# Patient Record
Sex: FEMALE | Race: WHITE | NOT HISPANIC OR LATINO | Employment: UNEMPLOYED | URBAN - METROPOLITAN AREA
[De-identification: names, ages, dates, MRNs, and addresses within clinical notes are randomized per-mention and may not be internally consistent; named-entity substitution may affect disease eponyms.]

---

## 2019-08-27 ENCOUNTER — APPOINTMENT (OUTPATIENT)
Dept: RADIOLOGY | Facility: MEDICAL CENTER | Age: 39
DRG: 102 | End: 2019-08-27
Attending: EMERGENCY MEDICINE
Payer: COMMERCIAL

## 2019-08-27 ENCOUNTER — HOSPITAL ENCOUNTER (INPATIENT)
Facility: MEDICAL CENTER | Age: 39
LOS: 1 days | DRG: 102 | End: 2019-08-28
Attending: EMERGENCY MEDICINE | Admitting: INTERNAL MEDICINE
Payer: COMMERCIAL

## 2019-08-27 DIAGNOSIS — R47.9 SPEECH DISTURBANCE, UNSPECIFIED TYPE: ICD-10-CM

## 2019-08-27 DIAGNOSIS — R51.9 ACUTE NONINTRACTABLE HEADACHE, UNSPECIFIED HEADACHE TYPE: ICD-10-CM

## 2019-08-27 LAB
ABO + RH BLD: NORMAL
ABO GROUP BLD: NORMAL
ALBUMIN SERPL BCP-MCNC: 4.6 G/DL (ref 3.2–4.9)
ALBUMIN/GLOB SERPL: 1.3 G/DL
ALP SERPL-CCNC: 67 U/L (ref 30–99)
ALT SERPL-CCNC: 17 U/L (ref 2–50)
ANION GAP SERPL CALC-SCNC: 11 MMOL/L (ref 0–11.9)
APTT PPP: 28.3 SEC (ref 24.7–36)
AST SERPL-CCNC: 20 U/L (ref 12–45)
BASOPHILS # BLD AUTO: 1.4 % (ref 0–1.8)
BASOPHILS # BLD: 0.14 K/UL (ref 0–0.12)
BILIRUB SERPL-MCNC: 0.5 MG/DL (ref 0.1–1.5)
BLD GP AB SCN SERPL QL: NORMAL
BUN SERPL-MCNC: 7 MG/DL (ref 8–22)
CALCIUM SERPL-MCNC: 9.9 MG/DL (ref 8.5–10.5)
CHLORIDE SERPL-SCNC: 105 MMOL/L (ref 96–112)
CO2 SERPL-SCNC: 20 MMOL/L (ref 20–33)
CREAT SERPL-MCNC: 0.71 MG/DL (ref 0.5–1.4)
EOSINOPHIL # BLD AUTO: 0.81 K/UL (ref 0–0.51)
EOSINOPHIL NFR BLD: 7.9 % (ref 0–6.9)
ERYTHROCYTE [DISTWIDTH] IN BLOOD BY AUTOMATED COUNT: 43.9 FL (ref 35.9–50)
GLOBULIN SER CALC-MCNC: 3.5 G/DL (ref 1.9–3.5)
GLUCOSE BLD-MCNC: 82 MG/DL (ref 65–99)
GLUCOSE SERPL-MCNC: 92 MG/DL (ref 65–99)
HCT VFR BLD AUTO: 44 % (ref 37–47)
HGB BLD-MCNC: 15.3 G/DL (ref 12–16)
IMM GRANULOCYTES # BLD AUTO: 0.02 K/UL (ref 0–0.11)
IMM GRANULOCYTES NFR BLD AUTO: 0.2 % (ref 0–0.9)
INR PPP: 0.94 (ref 0.87–1.13)
LYMPHOCYTES # BLD AUTO: 4.73 K/UL (ref 1–4.8)
LYMPHOCYTES NFR BLD: 46.4 % (ref 22–41)
MCH RBC QN AUTO: 33 PG (ref 27–33)
MCHC RBC AUTO-ENTMCNC: 34.8 G/DL (ref 33.6–35)
MCV RBC AUTO: 94.8 FL (ref 81.4–97.8)
MONOCYTES # BLD AUTO: 0.75 K/UL (ref 0–0.85)
MONOCYTES NFR BLD AUTO: 7.4 % (ref 0–13.4)
NEUTROPHILS # BLD AUTO: 3.75 K/UL (ref 2–7.15)
NEUTROPHILS NFR BLD: 36.7 % (ref 44–72)
NRBC # BLD AUTO: 0 K/UL
NRBC BLD-RTO: 0 /100 WBC
PLATELET # BLD AUTO: 357 K/UL (ref 164–446)
PMV BLD AUTO: 9.7 FL (ref 9–12.9)
POTASSIUM SERPL-SCNC: 3.8 MMOL/L (ref 3.6–5.5)
PROT SERPL-MCNC: 8.1 G/DL (ref 6–8.2)
PROTHROMBIN TIME: 12.7 SEC (ref 12–14.6)
RBC # BLD AUTO: 4.64 M/UL (ref 4.2–5.4)
RH BLD: NORMAL
SODIUM SERPL-SCNC: 136 MMOL/L (ref 135–145)
TROPONIN T SERPL-MCNC: 6 NG/L (ref 6–19)
WBC # BLD AUTO: 10.2 K/UL (ref 4.8–10.8)

## 2019-08-27 PROCEDURE — 86901 BLOOD TYPING SEROLOGIC RH(D): CPT

## 2019-08-27 PROCEDURE — 85730 THROMBOPLASTIN TIME PARTIAL: CPT

## 2019-08-27 PROCEDURE — 770020 HCHG ROOM/CARE - TELE (206)

## 2019-08-27 PROCEDURE — 70498 CT ANGIOGRAPHY NECK: CPT

## 2019-08-27 PROCEDURE — 70496 CT ANGIOGRAPHY HEAD: CPT

## 2019-08-27 PROCEDURE — 80053 COMPREHEN METABOLIC PANEL: CPT

## 2019-08-27 PROCEDURE — 84484 ASSAY OF TROPONIN QUANT: CPT

## 2019-08-27 PROCEDURE — 700105 HCHG RX REV CODE 258: Performed by: EMERGENCY MEDICINE

## 2019-08-27 PROCEDURE — 86850 RBC ANTIBODY SCREEN: CPT

## 2019-08-27 PROCEDURE — 99285 EMERGENCY DEPT VISIT HI MDM: CPT

## 2019-08-27 PROCEDURE — 700102 HCHG RX REV CODE 250 W/ 637 OVERRIDE(OP): Performed by: EMERGENCY MEDICINE

## 2019-08-27 PROCEDURE — 70450 CT HEAD/BRAIN W/O DYE: CPT

## 2019-08-27 PROCEDURE — 82962 GLUCOSE BLOOD TEST: CPT

## 2019-08-27 PROCEDURE — A9270 NON-COVERED ITEM OR SERVICE: HCPCS | Performed by: EMERGENCY MEDICINE

## 2019-08-27 PROCEDURE — 96374 THER/PROPH/DIAG INJ IV PUSH: CPT

## 2019-08-27 PROCEDURE — 0042T CT-CEREBRAL PERFUSION ANALYSIS: CPT

## 2019-08-27 PROCEDURE — 99223 1ST HOSP IP/OBS HIGH 75: CPT | Performed by: PSYCHIATRY & NEUROLOGY

## 2019-08-27 PROCEDURE — 96375 TX/PRO/DX INJ NEW DRUG ADDON: CPT

## 2019-08-27 PROCEDURE — 83735 ASSAY OF MAGNESIUM: CPT

## 2019-08-27 PROCEDURE — 85025 COMPLETE CBC W/AUTO DIFF WBC: CPT

## 2019-08-27 PROCEDURE — 700117 HCHG RX CONTRAST REV CODE 255: Performed by: EMERGENCY MEDICINE

## 2019-08-27 PROCEDURE — 700111 HCHG RX REV CODE 636 W/ 250 OVERRIDE (IP): Performed by: EMERGENCY MEDICINE

## 2019-08-27 PROCEDURE — 85610 PROTHROMBIN TIME: CPT

## 2019-08-27 PROCEDURE — 71045 X-RAY EXAM CHEST 1 VIEW: CPT

## 2019-08-27 PROCEDURE — 86900 BLOOD TYPING SEROLOGIC ABO: CPT

## 2019-08-27 PROCEDURE — 36415 COLL VENOUS BLD VENIPUNCTURE: CPT

## 2019-08-27 PROCEDURE — 93005 ELECTROCARDIOGRAM TRACING: CPT | Performed by: EMERGENCY MEDICINE

## 2019-08-27 RX ORDER — POLYETHYLENE GLYCOL 3350 17 G/17G
1 POWDER, FOR SOLUTION ORAL
Status: DISCONTINUED | OUTPATIENT
Start: 2019-08-27 | End: 2019-08-28 | Stop reason: HOSPADM

## 2019-08-27 RX ORDER — AMOXICILLIN 250 MG
2 CAPSULE ORAL 2 TIMES DAILY
Status: DISCONTINUED | OUTPATIENT
Start: 2019-08-28 | End: 2019-08-28 | Stop reason: HOSPADM

## 2019-08-27 RX ORDER — KETOROLAC TROMETHAMINE 30 MG/ML
15 INJECTION, SOLUTION INTRAMUSCULAR; INTRAVENOUS ONCE
Status: COMPLETED | OUTPATIENT
Start: 2019-08-27 | End: 2019-08-27

## 2019-08-27 RX ORDER — ACETAMINOPHEN 325 MG/1
650 TABLET ORAL EVERY 6 HOURS PRN
Status: DISCONTINUED | OUTPATIENT
Start: 2019-08-27 | End: 2019-08-28 | Stop reason: HOSPADM

## 2019-08-27 RX ORDER — SODIUM CHLORIDE 9 MG/ML
1000 INJECTION, SOLUTION INTRAVENOUS ONCE
Status: COMPLETED | OUTPATIENT
Start: 2019-08-27 | End: 2019-08-27

## 2019-08-27 RX ORDER — NICOTINE 21 MG/24HR
14 PATCH, TRANSDERMAL 24 HOURS TRANSDERMAL
Status: DISCONTINUED | OUTPATIENT
Start: 2019-08-28 | End: 2019-08-28 | Stop reason: HOSPADM

## 2019-08-27 RX ORDER — METOCLOPRAMIDE HYDROCHLORIDE 5 MG/ML
10 INJECTION INTRAMUSCULAR; INTRAVENOUS ONCE
Status: COMPLETED | OUTPATIENT
Start: 2019-08-27 | End: 2019-08-27

## 2019-08-27 RX ORDER — ACETAMINOPHEN 325 MG/1
650 TABLET ORAL ONCE
Status: COMPLETED | OUTPATIENT
Start: 2019-08-27 | End: 2019-08-27

## 2019-08-27 RX ORDER — DIPHENHYDRAMINE HYDROCHLORIDE 50 MG/ML
25 INJECTION INTRAMUSCULAR; INTRAVENOUS ONCE
Status: COMPLETED | OUTPATIENT
Start: 2019-08-27 | End: 2019-08-27

## 2019-08-27 RX ORDER — BISACODYL 10 MG
10 SUPPOSITORY, RECTAL RECTAL
Status: DISCONTINUED | OUTPATIENT
Start: 2019-08-27 | End: 2019-08-28 | Stop reason: HOSPADM

## 2019-08-27 RX ADMIN — METOCLOPRAMIDE 10 MG: 5 INJECTION, SOLUTION INTRAMUSCULAR; INTRAVENOUS at 22:30

## 2019-08-27 RX ADMIN — ACETAMINOPHEN 650 MG: 325 TABLET ORAL at 22:29

## 2019-08-27 RX ADMIN — DIPHENHYDRAMINE HYDROCHLORIDE 25 MG: 50 INJECTION INTRAMUSCULAR; INTRAVENOUS at 22:30

## 2019-08-27 RX ADMIN — IOHEXOL 120 ML: 350 INJECTION, SOLUTION INTRAVENOUS at 21:27

## 2019-08-27 RX ADMIN — KETOROLAC TROMETHAMINE 15 MG: 30 INJECTION, SOLUTION INTRAMUSCULAR at 22:30

## 2019-08-27 RX ADMIN — SODIUM CHLORIDE 1000 ML: 9 INJECTION, SOLUTION INTRAVENOUS at 22:29

## 2019-08-27 SDOH — HEALTH STABILITY: MENTAL HEALTH: HOW MANY STANDARD DRINKS CONTAINING ALCOHOL DO YOU HAVE ON A TYPICAL DAY?: 1 OR 2

## 2019-08-27 SDOH — HEALTH STABILITY: MENTAL HEALTH: HOW OFTEN DO YOU HAVE 6 OR MORE DRINKS ON ONE OCCASION?: NEVER

## 2019-08-27 SDOH — HEALTH STABILITY: MENTAL HEALTH: HOW OFTEN DO YOU HAVE A DRINK CONTAINING ALCOHOL?: 2-4 TIMES A MONTH

## 2019-08-27 ASSESSMENT — ENCOUNTER SYMPTOMS
TINGLING: 0
HEADACHES: 1
WEAKNESS: 0
DOUBLE VISION: 0
MEMORY LOSS: 0
BLURRED VISION: 1
LOSS OF CONSCIOUSNESS: 0
FOCAL WEAKNESS: 0
NECK PAIN: 0
SHORTNESS OF BREATH: 0
PHOTOPHOBIA: 0
FEVER: 0
SENSORY CHANGE: 0
DIZZINESS: 1
FALLS: 0
SPEECH CHANGE: 1
CHILLS: 0
TREMORS: 0
NERVOUS/ANXIOUS: 1
BACK PAIN: 0
SEIZURES: 0
NAUSEA: 0
MYALGIAS: 0
VOMITING: 0

## 2019-08-28 ENCOUNTER — APPOINTMENT (OUTPATIENT)
Dept: RADIOLOGY | Facility: MEDICAL CENTER | Age: 39
DRG: 102 | End: 2019-08-28
Attending: STUDENT IN AN ORGANIZED HEALTH CARE EDUCATION/TRAINING PROGRAM
Payer: COMMERCIAL

## 2019-08-28 ENCOUNTER — PATIENT OUTREACH (OUTPATIENT)
Dept: HEALTH INFORMATION MANAGEMENT | Facility: OTHER | Age: 39
End: 2019-08-28

## 2019-08-28 ENCOUNTER — APPOINTMENT (OUTPATIENT)
Dept: CARDIOLOGY | Facility: MEDICAL CENTER | Age: 39
DRG: 102 | End: 2019-08-28
Attending: STUDENT IN AN ORGANIZED HEALTH CARE EDUCATION/TRAINING PROGRAM
Payer: COMMERCIAL

## 2019-08-28 VITALS
WEIGHT: 169.97 LBS | BODY MASS INDEX: 31.28 KG/M2 | SYSTOLIC BLOOD PRESSURE: 97 MMHG | RESPIRATION RATE: 16 BRPM | HEIGHT: 62 IN | HEART RATE: 60 BPM | OXYGEN SATURATION: 98 % | DIASTOLIC BLOOD PRESSURE: 44 MMHG | TEMPERATURE: 98 F

## 2019-08-28 PROBLEM — R47.01 APHASIA: Status: ACTIVE | Noted: 2019-08-28

## 2019-08-28 PROBLEM — G45.9 TIA (TRANSIENT ISCHEMIC ATTACK): Status: ACTIVE | Noted: 2019-08-28

## 2019-08-28 LAB
ANION GAP SERPL CALC-SCNC: 8 MMOL/L (ref 0–11.9)
APPEARANCE UR: CLEAR
BILIRUB UR QL STRIP.AUTO: NEGATIVE
BUN SERPL-MCNC: 9 MG/DL (ref 8–22)
CALCIUM SERPL-MCNC: 8.9 MG/DL (ref 8.5–10.5)
CHLORIDE SERPL-SCNC: 110 MMOL/L (ref 96–112)
CHOLEST SERPL-MCNC: 195 MG/DL (ref 100–199)
CO2 SERPL-SCNC: 19 MMOL/L (ref 20–33)
COLOR UR: YELLOW
CREAT SERPL-MCNC: 0.77 MG/DL (ref 0.5–1.4)
CRP SERPL HS-MCNC: 0.09 MG/DL (ref 0–0.75)
DIGOXIN SERPL-MCNC: <0.1 NG/ML (ref 0.8–2)
EKG IMPRESSION: NORMAL
ERYTHROCYTE [DISTWIDTH] IN BLOOD BY AUTOMATED COUNT: 45.1 FL (ref 35.9–50)
ERYTHROCYTE [SEDIMENTATION RATE] IN BLOOD BY WESTERGREN METHOD: 3 MM/HOUR (ref 0–20)
EST. AVERAGE GLUCOSE BLD GHB EST-MCNC: 114 MG/DL
GLUCOSE SERPL-MCNC: 100 MG/DL (ref 65–99)
GLUCOSE UR STRIP.AUTO-MCNC: NEGATIVE MG/DL
HBA1C MFR BLD: 5.6 % (ref 0–5.6)
HCT VFR BLD AUTO: 42.3 % (ref 37–47)
HDLC SERPL-MCNC: 39 MG/DL
HGB BLD-MCNC: 14.1 G/DL (ref 12–16)
KETONES UR STRIP.AUTO-MCNC: NEGATIVE MG/DL
LDLC SERPL CALC-MCNC: 136 MG/DL
LEUKOCYTE ESTERASE UR QL STRIP.AUTO: NEGATIVE
LV EJECT FRACT  99904: 75
LV EJECT FRACT MOD 2C 99903: 83.53
LV EJECT FRACT MOD 4C 99902: 74.14
LV EJECT FRACT MOD BP 99901: 80.27
MAGNESIUM SERPL-MCNC: 2.1 MG/DL (ref 1.5–2.5)
MCH RBC QN AUTO: 32.1 PG (ref 27–33)
MCHC RBC AUTO-ENTMCNC: 33.3 G/DL (ref 33.6–35)
MCV RBC AUTO: 96.4 FL (ref 81.4–97.8)
MICRO URNS: NORMAL
NITRITE UR QL STRIP.AUTO: NEGATIVE
PH UR STRIP.AUTO: 6 [PH] (ref 5–8)
PLATELET # BLD AUTO: 354 K/UL (ref 164–446)
PMV BLD AUTO: 9.6 FL (ref 9–12.9)
POTASSIUM SERPL-SCNC: 3.5 MMOL/L (ref 3.6–5.5)
PROCALCITONIN SERPL-MCNC: <0.05 NG/ML
PROT UR QL STRIP: NEGATIVE MG/DL
RBC # BLD AUTO: 4.39 M/UL (ref 4.2–5.4)
RBC UR QL AUTO: NEGATIVE
SODIUM SERPL-SCNC: 137 MMOL/L (ref 135–145)
SP GR UR REFRACTOMETRY: >=1.045
TRIGL SERPL-MCNC: 100 MG/DL (ref 0–149)
TROPONIN T SERPL-MCNC: <6 NG/L (ref 6–19)
TSH SERPL DL<=0.005 MIU/L-ACNC: 4.36 UIU/ML (ref 0.38–5.33)
UROBILINOGEN UR STRIP.AUTO-MCNC: 1 MG/DL
WBC # BLD AUTO: 11.6 K/UL (ref 4.8–10.8)

## 2019-08-28 PROCEDURE — 93970 EXTREMITY STUDY: CPT

## 2019-08-28 PROCEDURE — 85027 COMPLETE CBC AUTOMATED: CPT

## 2019-08-28 PROCEDURE — 84484 ASSAY OF TROPONIN QUANT: CPT

## 2019-08-28 PROCEDURE — 93306 TTE W/DOPPLER COMPLETE: CPT | Mod: 26 | Performed by: INTERNAL MEDICINE

## 2019-08-28 PROCEDURE — 81003 URINALYSIS AUTO W/O SCOPE: CPT

## 2019-08-28 PROCEDURE — A9270 NON-COVERED ITEM OR SERVICE: HCPCS | Performed by: STUDENT IN AN ORGANIZED HEALTH CARE EDUCATION/TRAINING PROGRAM

## 2019-08-28 PROCEDURE — 700117 HCHG RX CONTRAST REV CODE 255: Performed by: STUDENT IN AN ORGANIZED HEALTH CARE EDUCATION/TRAINING PROGRAM

## 2019-08-28 PROCEDURE — 93306 TTE W/DOPPLER COMPLETE: CPT

## 2019-08-28 PROCEDURE — 83036 HEMOGLOBIN GLYCOSYLATED A1C: CPT

## 2019-08-28 PROCEDURE — 99223 1ST HOSP IP/OBS HIGH 75: CPT | Performed by: INTERNAL MEDICINE

## 2019-08-28 PROCEDURE — 86140 C-REACTIVE PROTEIN: CPT

## 2019-08-28 PROCEDURE — 36415 COLL VENOUS BLD VENIPUNCTURE: CPT

## 2019-08-28 PROCEDURE — 84145 PROCALCITONIN (PCT): CPT

## 2019-08-28 PROCEDURE — 80162 ASSAY OF DIGOXIN TOTAL: CPT

## 2019-08-28 PROCEDURE — 700102 HCHG RX REV CODE 250 W/ 637 OVERRIDE(OP): Performed by: STUDENT IN AN ORGANIZED HEALTH CARE EDUCATION/TRAINING PROGRAM

## 2019-08-28 PROCEDURE — 70553 MRI BRAIN STEM W/O & W/DYE: CPT

## 2019-08-28 PROCEDURE — 93970 EXTREMITY STUDY: CPT | Mod: 26 | Performed by: INTERNAL MEDICINE

## 2019-08-28 PROCEDURE — 80061 LIPID PANEL: CPT

## 2019-08-28 PROCEDURE — 84443 ASSAY THYROID STIM HORMONE: CPT

## 2019-08-28 PROCEDURE — 99222 1ST HOSP IP/OBS MODERATE 55: CPT | Mod: GC | Performed by: INTERNAL MEDICINE

## 2019-08-28 PROCEDURE — 80048 BASIC METABOLIC PNL TOTAL CA: CPT

## 2019-08-28 PROCEDURE — 85652 RBC SED RATE AUTOMATED: CPT

## 2019-08-28 PROCEDURE — 99231 SBSQ HOSP IP/OBS SF/LOW 25: CPT | Performed by: PSYCHIATRY & NEUROLOGY

## 2019-08-28 RX ORDER — NICOTINE 21 MG/24HR
14 PATCH, TRANSDERMAL 24 HOURS TRANSDERMAL CONTINUOUS
Qty: 14 PATCH | Refills: 0 | Status: SHIPPED | OUTPATIENT
Start: 2019-08-28 | End: 2019-09-11

## 2019-08-28 RX ORDER — ATORVASTATIN CALCIUM 80 MG/1
40 TABLET, FILM COATED ORAL EVERY EVENING
Qty: 30 TAB | Refills: 1 | Status: SHIPPED | OUTPATIENT
Start: 2019-08-28 | End: 2020-06-08

## 2019-08-28 RX ORDER — ASPIRIN 325 MG
325 TABLET ORAL DAILY
Status: DISCONTINUED | OUTPATIENT
Start: 2019-08-28 | End: 2019-08-28 | Stop reason: HOSPADM

## 2019-08-28 RX ORDER — ACETAMINOPHEN 325 MG/1
650 TABLET ORAL EVERY 6 HOURS PRN
Qty: 30 TAB | Refills: 0 | Status: SHIPPED | OUTPATIENT
Start: 2019-08-28 | End: 2020-06-08

## 2019-08-28 RX ORDER — ATORVASTATIN CALCIUM 20 MG/1
80 TABLET, FILM COATED ORAL EVERY EVENING
Status: DISCONTINUED | OUTPATIENT
Start: 2019-08-28 | End: 2019-08-28 | Stop reason: HOSPADM

## 2019-08-28 RX ADMIN — ASPIRIN 325 MG: 325 TABLET, FILM COATED ORAL at 06:17

## 2019-08-28 RX ADMIN — GADOTERIDOL 15 ML: 279.3 INJECTION, SOLUTION INTRAVENOUS at 12:04

## 2019-08-28 SDOH — HEALTH STABILITY: MENTAL HEALTH: HOW MANY STANDARD DRINKS CONTAINING ALCOHOL DO YOU HAVE ON A TYPICAL DAY?: 3 OR 4

## 2019-08-28 ASSESSMENT — ENCOUNTER SYMPTOMS
SPEECH CHANGE: 0
LOSS OF CONSCIOUSNESS: 0
VOMITING: 0
WHEEZING: 0
FLANK PAIN: 0
STRIDOR: 0
EYE DISCHARGE: 0
ORTHOPNEA: 0
PHOTOPHOBIA: 0
WEIGHT LOSS: 0
DOUBLE VISION: 0
WEAKNESS: 0
HALLUCINATIONS: 0
SINUS PAIN: 0
MYALGIAS: 0
NAUSEA: 0
PND: 0
COUGH: 0
SENSORY CHANGE: 0
DIZZINESS: 0
DEPRESSION: 0
EYE REDNESS: 0
NECK PAIN: 0
SORE THROAT: 0
SPEECH CHANGE: 1
FOCAL WEAKNESS: 0
HEARTBURN: 0
DOUBLE VISION: 1
FEVER: 0
EYE PAIN: 0
BLURRED VISION: 1
SEIZURES: 0
TINGLING: 0
SHORTNESS OF BREATH: 0
BRUISES/BLEEDS EASILY: 0
CLAUDICATION: 0
BACK PAIN: 0
SPUTUM PRODUCTION: 0
NERVOUS/ANXIOUS: 0
CONSTIPATION: 0
TREMORS: 0
HEADACHES: 1
BLOOD IN STOOL: 0
HEMOPTYSIS: 0
CHILLS: 0
ABDOMINAL PAIN: 0
INSOMNIA: 0
DIAPHORESIS: 0
BLURRED VISION: 0
PALPITATIONS: 0
DIARRHEA: 0

## 2019-08-28 ASSESSMENT — COGNITIVE AND FUNCTIONAL STATUS - GENERAL
CLIMB 3 TO 5 STEPS WITH RAILING: A LITTLE
DAILY ACTIVITIY SCORE: 23
WALKING IN HOSPITAL ROOM: A LITTLE
STANDING UP FROM CHAIR USING ARMS: A LITTLE
SUGGESTED CMS G CODE MODIFIER MOBILITY: CJ
MOBILITY SCORE: 21
SUGGESTED CMS G CODE MODIFIER DAILY ACTIVITY: CI
HELP NEEDED FOR BATHING: A LITTLE

## 2019-08-28 ASSESSMENT — COPD QUESTIONNAIRES
COPD SCREENING SCORE: 2
HAVE YOU SMOKED AT LEAST 100 CIGARETTES IN YOUR ENTIRE LIFE: YES
DURING THE PAST 4 WEEKS HOW MUCH DID YOU FEEL SHORT OF BREATH: NONE/LITTLE OF THE TIME
IN THE PAST 12 MONTHS DO YOU DO LESS THAN YOU USED TO BECAUSE OF YOUR BREATHING PROBLEMS: DISAGREE/UNSURE
DO YOU EVER COUGH UP ANY MUCUS OR PHLEGM?: NO/ONLY WITH OCCASIONAL COLDS OR INFECTIONS

## 2019-08-28 ASSESSMENT — PATIENT HEALTH QUESTIONNAIRE - PHQ9
SUM OF ALL RESPONSES TO PHQ9 QUESTIONS 1 AND 2: 0
2. FEELING DOWN, DEPRESSED, IRRITABLE, OR HOPELESS: NOT AT ALL
1. LITTLE INTEREST OR PLEASURE IN DOING THINGS: NOT AT ALL

## 2019-08-28 ASSESSMENT — LIFESTYLE VARIABLES
EVER_SMOKED: YES
SUBSTANCE_ABUSE: 0

## 2019-08-28 NOTE — ED PROVIDER NOTES
ED Provider Note    Scribed for Onur Townsend M.D. by Alex Reich. 8/27/2019  9:13 PM    Primary care provider: No primary care provider on file.  Means of arrival: Walk in  History obtained from: Patient and Family Members  History limited by: None    CHIEF COMPLAINT  Chief Complaint   Patient presents with   • Possible Stroke     sudden onset at 1900.   • Slurred Speech   • Headache   • Aphasia       HPI  Echo Brittni Garrison is a 38 y.o. female who presents to the Emergency Department for a possible stroke. The patient sates that 1 hour ago she began to have slurred speech. She then began to experience a headache, difficulty word finding and right sided vision loss. Her  notes that at baseline she has unusually low blood pressure She is not allergic to any medications. She denies any vomiting, diarrhea, or substance abuse. The patient does not state any exacerbating or alleviating factors.     REVIEW OF SYSTEMS  Pertinent positives include: headache, slurred speech, difficulty word finding, and right sided vision loss. Pertinent negatives include: vomiting, diarrhea, or substance abuse. See history of present illness. All other systems are negative.     PAST MEDICAL HISTORY   Patient denies any past medical history.     SURGICAL HISTORY   has a past surgical history that includes cholecystectomy (2018).    SOCIAL HISTORY  Social History     Tobacco Use   • Smoking status: Never Smoker   • Smokeless tobacco: Never Used   Substance Use Topics   • Alcohol use: Yes     Frequency: 2-4 times a month     Drinks per session: 1 or 2     Binge frequency: Never   • Drug use: Never      Social History     Substance and Sexual Activity   Drug Use Never       FAMILY HISTORY  History reviewed. No pertinent family history.    CURRENT MEDICATIONS  Home Medications     Reviewed by Sonja John R.N. (Registered Nurse) on 08/27/19 at 2051  Med List Status: Complete   Medication Last Dose Status        Patient  "German Taking any Medications                       ALLERGIES  Allergies   Allergen Reactions   • Pcn [Penicillins] Anaphylaxis       PHYSICAL EXAM  VITAL SIGNS: BP (!) 171/95   Pulse (!) 110   Temp 36.7 °C (98.1 °F) (Temporal)   Resp 20   Ht 1.575 m (5' 2\")   Wt 77.1 kg (170 lb)   LMP 08/13/2019 (Approximate)   SpO2 98%   BMI 31.09 kg/m²     Constitutional: Difficulty word finding. Frustrated.   HENT: No signs of trauma, Bilateral external ears normal, Nose normal. Uvula midline.   Eyes: EOM intact. Pupils are equal and reactive, Conjunctiva normal, Non-icteric.   Neck: Normal range of motion, No tenderness, Supple, No stridor.   Lymphatic: No lymphadenopathy noted.   Cardiovascular: Regular rate and rhythm, no murmurs.   Thorax & Lungs: Normal breath sounds, No respiratory distress, No wheezing, No chest tenderness.   Abdomen:  Soft, No tenderness, No peritoneal signs, No masses, No pulsatile masses.   Skin: Warm, Dry, No erythema, No rash.   Back: No bony tenderness, No CVA tenderness.   Extremities: Bilateral upper and lower extremities have 5/5 strength. Intact distal pulses, No edema, No tenderness, No cyanosis.  Musculoskeletal: Good range of motion in all major joints. No tenderness to palpation or major deformities noted.   Neurologic: Alert , Normal motor function, Normal sensory function, No focal deficits noted. Cranial nerves II through XII intact.  5 out of 5 strength x4.  Sensation intact light touch.  Normal finger-nose-finger.  Normal reflexes bilaterally.  No clonus. EOMI. PERRL.    Psychiatric: Difficulty word finding. Frustrated.     DIAGNOSTIC STUDIES / PROCEDURES    LABS  Labs Reviewed   CBC WITH DIFFERENTIAL - Abnormal; Notable for the following components:       Result Value    Neutrophils-Polys 36.70 (*)     Lymphocytes 46.40 (*)     Eosinophils 7.90 (*)     Eos (Absolute) 0.81 (*)     Baso (Absolute) 0.14 (*)     All other components within normal limits    Narrative:     Indicate " which anticoagulants the patient is on:->UNKNOWN   COMP METABOLIC PANEL - Abnormal; Notable for the following components:    Bun 7 (*)     All other components within normal limits    Narrative:     Indicate which anticoagulants the patient is on:->UNKNOWN   PROTHROMBIN TIME    Narrative:     Indicate which anticoagulants the patient is on:->UNKNOWN   APTT    Narrative:     Indicate which anticoagulants the patient is on:->UNKNOWN   COD (ADULT)   TROPONIN    Narrative:     Indicate which anticoagulants the patient is on:->UNKNOWN   ABO RH CONFIRM   ESTIMATED GFR    Narrative:     Indicate which anticoagulants the patient is on:->UNKNOWN   URINALYSIS,CULTURE IF INDICATED   ACCU-CHEK GLUCOSE      All labs reviewed by me.    EKG  12 Lead EKG interpreted by me to show:  Indication: Arrhythmia   Normal sinus rhythm  Rate 91  Axis: Normal  Intervals: Normal  Normal T waves  Normal ST segments  My impression of this EKG: No STEMI.     RADIOLOGY  DX-CHEST-PORTABLE (1 VIEW)   Final Result      No acute cardiopulmonary abnormality.      CT-CTA HEAD WITH & W/O-POST PROCESS   Final Result      CT angiogram of the [place] within normal limits.      CT-CTA NECK WITH & W/O-POST PROCESSING   Final Result      CT angiogram of the neck within normal limits.      CT-CEREBRAL PERFUSION ANALYSIS   Final Result      1.  Cerebral blood flow less than 30% likely representing completed infarct = 0 mL.      2.  T Max more than 6 seconds likely representing combination of completed infarct and ischemia = 10 mL.      3.  Mismatched volume likely representing ischemic brain/penumbra = 10 mL      4.  Please note that the cerebral perfusion was performed on the limited brain tissue around the basal ganglia region. Infarct/ischemia outside the CT perfusion sections can be missed in this study.      CT-HEAD W/O   Final Result      Normal CT scan of the head without contrast.               INTERPRETING LOCATION:  [address] [place] [address] [place],  [address]        The radiologist's interpretation of all radiological studies have been reviewed by me.    COURSE & MEDICAL DECISION MAKING  Nursing notes, VS, PMSFHx reviewed in chart.    38 y.o. female p/w chief complaint of slurred speech and difficulty word finding.    9:13 PM Patient seen and examined at bedside.      The differential diagnoses include but are not limited to:  Upon arrival to emergency department stroke alert called    Pt presents w/ hx and PE concerning for CVA  Patient taken to CAT scan emergently after history obtained  CT scans reviewed by me and radiologist emergently  No evidence of ICH or abnormal CT scans at this time    Pt will initiate daily ASA and statin  No e/o significant electrolye abnormality as etiology of sx  No hx to suggest traumatic etiology  No hx or PE to suggest cord or nerve impingement  Today symptoms could represent SAH. Given ct scan unremarkable within in the first 6 hours doubtful this is representation of SAH.   9:15 PM I discussed the patient's case and the above findings with Neurology who agrees no TPA     9:41 PM I met with Neurology at bedside. He did not recommend TPA at this time. I informed the patient that she will need to stay overnight to get 24 hours. Patient verbally understands and agrees to plan of care.      10:15 PM [facility] was paged at this time.     10:18 PM I discussed the patient's case and the above findings with Dr. Vargas ([facility] internal med) who agrees to admit the patient.        HYDRATION: Based on the patient's presentation of Other HA the patient was given IV fluids. IV Hydration was used because oral hydration was not adequate alone. Upon recheck following hydration, the patient w/ improvement in sx     CRITICAL CARE  The very real possibility of a deterioration of this patient's condition required the highest level of my preparedness for sudden, emergent intervention.  I provided critical care services, which included medication orders, frequent  reevaluations of the patient's condition and response to treatment, ordering and reviewing test results, and discussing the case with various consultants.  The critical care time associated with the care of the patient was 30 minutes. Review chart for interventions. This time is exclusive of any other billable procedures.      DISPOSITION:  Patient will be admitted to Dr. Burns in guarded condition.     FINAL IMPRESSION  1. Speech disturbance, unspecified type    2. Acute nonintractable headache, unspecified headache type       Critical care time was preformed for 30 mins.      Alex VARGAS (Scribnisa), am scribing for, and in the presence of, Onur Townsend M.D..    Electronically signed by: Alex Reich (Scribe), 8/27/2019    Onur VARGAS M.D. personally performed the services described in this documentation, as scribed by Alex Reich in my presence, and it is both accurate and complete.  C  The note accurately reflects work and decisions made by me.  Onur Townsend  8/28/2019  5:58 AM

## 2019-08-28 NOTE — PROGRESS NOTES
Pt arrived on unit via gurmaria luisa. 2 RN  from ED. All precautions in place. Telemetry monitor on.

## 2019-08-28 NOTE — ED NOTES
Chief Complaint   Patient presents with   • Possible Stroke     sudden onset at 1900.   • Slurred Speech   • Headache   • Aphasia     Patient to triage via wheelchair with family. Sudden onset symptoms at 2000.  Denies anticoagulants.    Charge RN aware, stroke protocol initiated.

## 2019-08-28 NOTE — PROGRESS NOTES
Spoke with provider about getting telemetry ordered and Neurology's recommended stroke work up orders per note. Dr. Hernández said she would place orders recommended by Neurology. All orders place besides MRI. Will discuss with day shift about getting MRI ordered.

## 2019-08-28 NOTE — CONSULTS
Referring Physician: Dr. Onur Townsend    Referral Reason:  Stroke code    HPI:  Ms. Stefania Garrison is a 38 y.o. Right handed female with no significant past medical history except for heavy smoking who was brought to emergency room for evaluation of wart on difficulty as well as right visual field cut and severe left-sided headache.  Apparently at around 8 PM tonight she was having dinner with family when she started having slurred speech and word finding difficulty.  At the same time she complained of severe dizziness and left-sided headache.  She also complained of visual impairment in the right visual field.  She initially told me she went to [facility] today and apparently had high test and at that time it was noted she has visual field caught on the right side.  Her son at bedside tells me she went to the [facility] actually yesterday not today.  While in the emergency room her speech impairment had improved, although she continued to have right visual field cut as well as holo-cranial headache.      ROS:   Review of Systems   Constitutional: Negative for chills, fever and malaise/fatigue.   HENT: Negative for hearing loss and tinnitus.    Eyes: Positive for blurred vision. Negative for double vision and photophobia.   Respiratory: Negative for shortness of breath.    Cardiovascular: Negative for chest pain.   Gastrointestinal: Negative for nausea and vomiting.   Genitourinary: Negative for hematuria.   Musculoskeletal: Negative for back pain, falls, myalgias and neck pain.   Skin: Negative for rash.   Neurological: Positive for dizziness, speech change and headaches. Negative for tingling, tremors, sensory change, focal weakness, seizures, loss of consciousness and weakness.   Psychiatric/Behavioral: Negative for memory loss. The patient is nervous/anxious.        Past Medical History: History reviewed. No pertinent past medical history.    Past Surgical History:   Past Surgical History:   Procedure Laterality Date   •  CHOLECYSTECTOMY  2018       Social History:   Social History     Socioeconomic History   • Marital status: Not on file     Spouse name: Not on file   • Number of children: Not on file   • Years of education: Not on file   • Highest education level: Not on file   Occupational History   • Not on file   Social Needs   • Financial resource strain: Not on file   • Food insecurity:     Worry: Not on file     Inability: Not on file   • Transportation needs:     Medical: Not on file     Non-medical: Not on file   Tobacco Use   • Smoking status: Never Smoker   • Smokeless tobacco: Never Used   Substance and Sexual Activity   • Alcohol use: Yes     Frequency: 2-4 times a month     Drinks per session: 1 or 2     Binge frequency: Never   • Drug use: Never   • Sexual activity: Not on file   Lifestyle   • Physical activity:     Days per week: Not on file     Minutes per session: Not on file   • Stress: Not on file   Relationships   • Social connections:     Talks on phone: Not on file     Gets together: Not on file     Attends Mosque service: Not on file     Active member of club or organization: Not on file     Attends meetings of clubs or organizations: Not on file     Relationship status: Not on file   • Intimate partner violence:     Fear of current or ex partner: Not on file     Emotionally abused: Not on file     Physically abused: Not on file     Forced sexual activity: Not on file   Other Topics Concern   • Not on file   Social History Narrative   • Not on file       Family Hx: History reviewed. No pertinent family history.    Current Medications:   No current facility-administered medications for this encounter.      No current outpatient medications on file.       Allergies:   Allergies   Allergen Reactions   • Pcn [Penicillins] Anaphylaxis       Physical Exam:   Vitals:    08/27/19 2049 08/27/19 2054   BP: (!) 171/95    Pulse: (!) 110    Resp: 20    Temp: 36.7 °C (98.1 °F)    TempSrc: Temporal    SpO2: 98%   "  Weight:  77.1 kg (170 lb)   Height: 1.575 m (5' 2\")        Physical Exam   GENERAL:  Lying in the hospital bed in no apparent distress.  Head: Normocephalic and atraumatic.   Eyes: Pupils are equal, round, and reactive to light. EOM are normal.   Cardiovascular: Normal rate and regular rhythm.    Pulmonary/Chest: Breath sounds normal.   Abdominal: Soft. Bowel sounds are normal. He exhibits no distension. There is no tenderness.   Skin: Skin is warm and dry. No rash noted. No erythema.  Neuro Exam  MENTAL STATUS:  Awake, alert, oriented times 3.  Speech is fluent, comprehension is intact.  She has very mild expressive aphasia and word finding difficulty.  CRANIAL NERVES:  PERRL, EOMI with no nystagmus, face is symmetric, facial sensation is intact, tongue is in the midline, palate is symmetric. Hearing is intact to finger rub bilaterally. Shoulder shrugs are normal.  She has partial right visual field cut.  MOTOR:  Motor examination showed normal strength in direct testing of both upper and lower extremities, proximal and distal.    SENSATION:  Intact to light touch, temperature and proprioception throughout  REFLEXES:  2+ and symmetric, toes are downgoing bilaterally  COORDINATION:  Normal finger to nose and heel to shin bilaterally  GAIT:  Deferred       NIH Stroke Scale:    1a. Level of Consciousness (Alert, drowsy, etc): 0= Alert    1b. LOC Questions (Month, age): 0= Answers both correctly    1c. LOC Commands (Open/close eyes make fist/let go): 0= Obeys both correctly    2.   Best Gaze (Eyes open - patient follows examiner's finger on face): 0= Normal    3.   Visual Fields (introduce visual stimulus/threat to patient's field quadrants): 1= Partial Hemiania    4.   Facial Paresis (Show teeth, raise eyebrows and squeeze eyes shut): 0= Normal     5a. Motor Arm - Left (Elevate arm to 90 degrees if patient is sitting, 45 degrees if  supine): 0= No drift    5b. Motor Arm - Right (Elevate arm to 90 degrees if patient " is sitting, 45 degrees if supine): 0= No drift    6a. Motor Leg - Left (Elevate leg 30 degrees with patient supine): 0= No drift    6b. Motor Leg - Right  (Elevate leg 30 degrees with patient supine): 0= No drift    7.   Limb Ataxia (Finger-nose, heel down shin): 0= No ataxia    8.   Sensory (Pin prick to face, arm, trunk and leg - compare side to side): 0= Normal    9.  Best Language (Name item, describe a picture and read sentences): 1= Mild to moderate aphasia    10. Dysarthria (Evaluate speech clarity by patient repeating listed words): 0= Normal articulation    11. Extinction and Inattention (Use information from prior testing to identify neglect or  double simultaneous stimuli testing): 0= No neglect    Total NIH Score: 2     Labs:  Recent Labs     08/27/19 2116   WBC 10.2   RBC 4.64   HEMOGLOBIN 15.3   HEMATOCRIT 44.0   MCV 94.8   MCH 33.0   MCHC 34.8   RDW 43.9   PLATELETCT 357   MPV 9.7     Recent Labs     08/27/19 2116   SODIUM 136   POTASSIUM 3.8   CHLORIDE 105   CO2 20   GLUCOSE 92   BUN 7*   CREATININE 0.71   CALCIUM 9.9                     Recent Labs     08/27/19 2116   SODIUM 136   POTASSIUM 3.8   CHLORIDE 105   CO2 20   GLUCOSE 92   BUN 7*     Recent Labs     08/27/19 2116   SODIUM 136   POTASSIUM 3.8   CHLORIDE 105   CO2 20   BUN 7*   CREATININE 0.71   CALCIUM 9.9         No results found for this or any previous visit.      Imaging reviewed:    CT-CTA HEAD WITH & W/O-POST PROCESS   Final Result      CT angiogram of the [place] within normal limits.      CT-CTA NECK WITH & W/O-POST PROCESSING   Final Result      CT angiogram of the neck within normal limits.      CT-CEREBRAL PERFUSION ANALYSIS   Final Result      1.  Cerebral blood flow less than 30% likely representing completed infarct = 0 mL.      2.  T Max more than 6 seconds likely representing combination of completed infarct and ischemia = 10 mL.      3.  Mismatched volume likely representing ischemic brain/penumbra = 10 mL       4.  Please note that the cerebral perfusion was performed on the limited brain tissue around the basal ganglia region. Infarct/ischemia outside the CT perfusion sections can be missed in this study.      CT-HEAD W/O   Final Result      Normal CT scan of the head without contrast.               INTERPRETING LOCATION:  [address], [place], [address]      DX-CHEST-PORTABLE (1 VIEW)    (Results Pending)          Assessment/Plan:   38 y.o. female who presented with difficulty talking and expressive aphasia which is mild associated with severe, initially left-sided but subsequently holo-cranial headache and also visual field defect on the right side for the past 2 days.  Her current NIH scale score is 2 including her visual deficit which has definitely started yesterday.  Besides heavy smoking up to pack per day, she has no other stroke risk factor.  Although I cannot rule out the stroke, however I do not think she is a candidate for administration of TPA due to unknown onset of symptoms especially in light of her severe headache which make complex migraine and other possibility.  I discussed the risk and benefit of administration of TPA with the patient, her  and son at bedside and they all agree with not administering TPA in this situation.  Since initial evaluation to emergency room her speech has improved.  She will be admitted for stroke work-up to include brain MRI with and without contrast, echocardiogram and lipid profile.  She will be started on aspirin after swallow eval.  She will be evaluated by speech therapist.  She was counseled about heavy smoking.  Q4hr neuro checks.  Telemetry.  Permissive Hypertension at least 24hrs post event, allow <220/<120  Aspirin 325mg daily. HOLD ANTICOAGULATION.  Statin full dose high-potency LDL <70 GOAL  Check labs: Hgb A1C, lipid profile, sed rate, TSH, troponin, UA  NPO strict until dysphagia screen cleared.  Total critical care time spent in the emergency room was 50  minutes.  Greater than 50% of this 50 minute face to face encounter was devoted to disease state counseling on stroke and coordination of care. (see above assessment and plan for further details of discussion).

## 2019-08-28 NOTE — CONSULTS
"     Cardiology Consult    CC: Blurred vision and Word finding difficulties    HPI:  A 39 y/o female with no significant PMHx apart from chronic tobacco use ( 1-2 pack/day x 20 yrs ), who came to ER after she had blurring of vision and word finding difficulties.In the ED, patient tachycardic 110, /95, RR 20, saturating well on room air. Neurology NIHSS 2 for visual field defect and aphasia. Patient not given tPA. CT head, CTA head, neck, and perfusion imaging show no abnormalities.  CT head & CTA head & Head / Neck : Nil acute.   EKG : SR  ECHO showed EF 75% with \"Agitated saline (bubble) study was performed. There is a right to left   shunt noted around the 5th or 6th cardiac cycle suggestive of pulmonary   arterio-venous malformation. However, cannot definitely rule out an   intracardiac shunt (PFO) given the timing of when the bubbles cross. \"    Cardiology was consulted for further Mx of shunt. Pt has no family h/o of structural cardiac defects but one of her sons has a heart murmur when he was born.            MEDICATIONS:    Current Facility-Administered Medications:   •  aspirin (ASA) tablet 325 mg, 325 mg, Oral, DAILY, Kristi Hernández M.D., 325 mg at 08/28/19 0617  •  atorvastatin (LIPITOR) tablet 80 mg, 80 mg, Oral, Q EVENING, Kristi Hernández M.D.  •  senna-docusate (PERICOLACE or SENOKOT S) 8.6-50 MG per tablet 2 Tab, 2 Tab, Oral, BID **AND** polyethylene glycol/lytes (MIRALAX) PACKET 1 Packet, 1 Packet, Oral, QDAY PRN **AND** magnesium hydroxide (MILK OF MAGNESIA) suspension 30 mL, 30 mL, Oral, QDAY PRN **AND** bisacodyl (DULCOLAX) suppository 10 mg, 10 mg, Rectal, QDAY PRN, Kristi Hernández M.D.  •  acetaminophen (TYLENOL) tablet 650 mg, 650 mg, Oral, Q6HRS PRN, Kristi Hernández M.D.  •  nicotine (NICODERM) 14 MG/24HR 14 mg, 14 mg, Transdermal, Daily-0600 **AND** Nicotine Replacement Patient Education Materials, , , Once **AND** nicotine polacrilex (NICORETTE) 2 MG piece 2 mg, 2 mg, Oral, Q HOUR PRN, " Kristi Hernández M.D.    ALLERGIES:   Ms. Garrison  is allergic to pcn [penicillins].     SURGERIES:  Ms. Garrison   has a past surgical history that includes cholecystectomy (2018).     MEDICAL ILLNESSES:  Ms. Garrison   has no past medical history on file.     SOCIAL HISTORY:  Ms. Garrison   reports that she has been smoking cigarettes. She has a 30.00 pack-year smoking history. She has never used smokeless tobacco. She reports that she drinks alcohol. She reports that she has current or past drug history. Drugs: Marijuana and Inhaled.     FAMILY HISTORY:  Ms.'s Garrison  family history is not on file.      ROS:    Constitutional: Patient has had no fevers or chills or fatigue. Patient has has no significant weight change.  Eyes: Patient had no visual changes or vision loss.  ENT: Patient denies any sore throat or allergic rhinitis.  Respiratory: Patient has had no cough or sputum production. Also, no hemoptysis.  Cardiovascular: As noted above.  GI: Patient denies any nausea, vomiting, or diarrhea. Patient has had no hematemesis or melena.  : Patient denies any urinary urgency, frequency, or dysuria. Patient has noted no hematuria.  Orthopedic: Patient has no particular problem with arthritis. Patient is able to walk and exercise without difficulty.  Neurologic: Patient has had no focal numbness or weakness.   Psychiatric: Patient denies any difficulty with anxiety or depression.  Endocrine: Patient denies any history of thyroid disorder or diabetes. Patient denies any heat or cold intolerance. In addition, patient denies any polydipsia or polyuria.  Hematologic: Patient has had no easy bruising or bleeding. Patient denies any history of anemia.  Skin: Patient denies any unusual rashes or skin lesions.  Allergic/immunologic: Patient denies any difficulty with hayfever or other environmental allergens. Patient denies any food allergies.       PHYSICAL EXAM:    BP (!) 97/44   Pulse 60   Temp 36.7 °C (98 °F)  "(Temporal)   Resp 16   Ht 1.575 m (5' 2\")   Wt 77.1 kg (169 lb 15.6 oz)   LMP 08/13/2019 (Approximate)   SpO2 98%   BMI 31.09 kg/m²      General: in no acute distress.  HEENT: NC, AT, PERRL, EOMI, lids and conjunctiva are clear, moist oral mucosa. No JVD  Respit: CTAB, no wheezing or rales  Cardiac: RRR, no murmurs, S2 S2  Abdomen: soft, nontender, nondistended  Extremities: No clubbing, cyanosis, or edema is present.  Skin: warm  Neurologic: AOx3, No focal neurologic abnormality noted.    Lab Results   Component Value Date/Time    CHOLSTRLTOT 195 08/28/2019 05:04 AM     (H) 08/28/2019 05:04 AM    HDL 39 (A) 08/28/2019 05:04 AM    TRIGLYCERIDE 100 08/28/2019 05:04 AM       Lab Results   Component Value Date/Time    SODIUM 137 08/28/2019 05:04 AM    POTASSIUM 3.5 (L) 08/28/2019 05:04 AM    CHLORIDE 110 08/28/2019 05:04 AM    CO2 19 (L) 08/28/2019 05:04 AM    GLUCOSE 100 (H) 08/28/2019 05:04 AM    BUN 9 08/28/2019 05:04 AM    CREATININE 0.77 08/28/2019 05:04 AM     Lab Results   Component Value Date/Time    ALKPHOSPHAT 67 08/27/2019 09:16 PM    ASTSGOT 20 08/27/2019 09:16 PM    ALTSGPT 17 08/27/2019 09:16 PM    TBILIRUBIN 0.5 08/27/2019 09:16 PM      No results found for: BNPBTYPENAT    Patient Active Problem List    Diagnosis Date Noted   • TIA (transient ischemic attack) 08/28/2019   • Aphasia 08/28/2019         ASSESSMENT & PLAN:  # Possible TIA   # Right to left intra-cardiac shunt  -On Tele ,  Q4hr neuro checks  - Aspirin 325mg daily  - High does statin   - Neurology on board for possible TIA   - EKG : SR   - reviewed the ECHO, the shunting of blood is in mild degree   - no need to urgent intervention   - will order L/E DVT scan   - will follow the result   - might consider A fib evaluation based on DVT scan result & MRI brain result   - Thanks for the consult. Will follow the pt       "

## 2019-08-28 NOTE — SENIOR ADMIT NOTE
Senior Admit Note    Patient: Stefania Garrison.  MRN: [id number].                               Chief complaint: vision loss, difficulty word finding    HPI:  Ms Garrison is a 38 y F w/ no PMHx, presenting for vision loss and difficulty word finding. Patient was eating dinner tonight 8pm when she noticed right sided peripheral vision loss as well as difficulty with pronouncing the correct words. Denies facial droop, muscle weakness, numbness/tingling. Denies seizure like activity or confusion. She is not sure if she had slurred speech, but states she knew what to say but had a difficult time saying it and would find the wrong words to the point where she would get frustrated nobody could understand her. Family not at bedside currently to interview. Patient states her vision loss and aphasia have resolved now. She has a mild headache 2/10 pain since after her CT scan but it is much improved compared to when it started in the ED. Denies any known past medical history including stroke, heart attack, neurologic deficits. She does not take any medications, she takes ibuprofen for occasional headaches but not regularly.  In the ED, patient tachycardic 110, /95, RR 20, saturating well on room air. Neurology NIHSS 2 for visual field defect and aphasia. Patient not given tPA. CT head, CTA head, neck, and perfusion imaging show no abnormalities.    Gen: young woman lying in bed in NAD  CV: RRR, normal s1s2, no m/r/g, no JVD  RESP: CTAB, no wheezes or rales  Ext: no edema, pulses 2+ throughout  Neuro: no FND, CN's 2-12 intact, enlarged tonsils b/l, strength 5/5 throughout, sensation to touch intact throughout, DTR's 2+ throughout, FtN and PHILIP intact, visual fields full b/l, normal speech, balance not tested      Assessment and Plan:    # Vision loss, aphasia  # Possible Transient ischemic attack vs stroke      Admit to neurology as inpatient.  Unclear which quadrants or vision fields affected earlier as exam is now normal.  Possible left sided TIA vs stroke vs stroke mimic such as complex migraine.  CT head, CTA head, neck, perfusion scan all normal.  Neurology recommend MRI brain with and without contrast, echocardiogram.  Continue full dose aspirin daily.  Allow permissive HTN up to 220/120.  A1c, lipid profile, TSH.  Monitor on telemetry with q4h neuro checks.      DVT prophylaxis: SCD  Code status: FULL    For complete details, please refer to H&P by intern.     Scotty Vargas M.D.

## 2019-08-28 NOTE — PROGRESS NOTES
Internal Medicine Interval Note  Note Author: Megan Muniz M.D.     Name Stefania Garrison     1980   Age/Sex 38 y.o. female   MRN [id number]   Code Status Full     After 5PM or if no immediate response to page, please call for cross-coverage  Attending/Team:/[email or web address] See Patient List for primary contact information  Call [phone] to page    1st Call - Day Intern (R1):   Dr.Magin Jones 2nd Call - Day Sr. Resident (R2/R3):            Reason for interval visit  (Principal Problem)  Expressive aphasia, with transient visual loss, associated with headache    Interval Problem Daily Status Update  (24 hours, problem oriented, brief subjective history, new lab/imaging data pertinent to that problem)     Patient was seen this a.m., with symptoms completely resolved, no evidence of fascia or dysarthria, visual field intact with no evidence of scotomata, or visual field defects.  Patient does not have a history of heart attack, stroke, and presently does not have any neurologic deficits.  Scans reviewed, with CT angiogram of the [place] within normal limits, and CT angiogram of the neck within normal limits.  CT cerebral perfusion analysis shows a T-max more than 6 seconds with likely representing combination of completed infarct and ischemia= 10 mL.  EchoCardiogram done on 2019, shows a right to left shunt around the fifth or 6 cardiac cycles suggestive of pulmonary arteriovenous malformation but a possible intracardiac shunt PFO cannot be ruled out, with ejection fraction of 75%.  With a normal regional wall motion and normal diastolic function and a RVSP of 30mm Hg, Cardiology service paged Dr. Earl, cardiology team will evaluate patient for opinion.  EKG done on presentation revealed normal sinus rhythm.  Patient was given Tylenol and etodolac for headache in the emergency department with neurology deciding against TPA.  Lipid panel shows a cholesterol  of 195, triglycerides of 100, HDL of 39, and an LDL of 136 with a TSH of 4.3.  No other new problems was the same.  Total NIH score on admission was 2    ROS  Constitutional: Denies fevers.  Eyes: Denies changes in vision, no eye pain  Ears/Nose/Throat/Mouth: Denies nasal congestion or sore throat   Cardiovascular: Denies chest pain or palpitations   Respiratory: Denies shortness of breath , Denies cough  Gastrointestinal/Hepatic: Denies abdominal pain, nausea, vomiting, or diarrhea   Genitourinary: Denies bladder dysfunction, dysuria or frequency  Musculoskeletal/Rheum: No complaints   Skin/Breast: Denies rash   Neurological: Denies headache. Denied focal weakness/parasthesias  Psychiatric: No complaints  All other systems were reviewed and are negative (AMA/CMS criteria)    Disposition/Barriers to discharge:   None    Consultants/Specialty  Cardiology.  Dr. Earl/Martin  Neurology Dr. Townsend  PCP: Pcp Pt States None       Quality Measures  Quality-Core Measures        Physical Exam       Vitals:    08/28/19 0400 08/28/19 0639 08/28/19 0800 08/28/19 1125   BP: (!) 97/48  (!) 97/46 (!) 97/44   Pulse: 62  (!) 58 60   Resp: 16  16 16   Temp: 36.9 °C (98.4 °F)  36.7 °C (98 °F) 36.7 °C (98 °F)   TempSrc: Temporal  Temporal Temporal   SpO2: 93%  95% 98%   Weight:  77.1 kg (169 lb 15.6 oz)     Height:         Body mass index is 31.09 kg/m². Weight: 77.1 kg (169 lb 15.6 oz)  Oxygen Therapy:  Pulse Oximetry: 98 %, O2 (LPM): 0, O2 Delivery: None (Room Air)    Physical Exam    HEENT: grossly normal   Cardiovascular: Normal heart rate, Normal rhythm   Lungs: Respiratory effort is normal. Normal breath sounds  Abdomen: Bowel sounds normal, Soft, No tenderness  Skin: No erythema, No rash  Lower limbs: normal, no pitting edema   Neurologic: Alert & oriented x 3, Normal motor function, Normal sensory function, No focal deficits noted, cranial nerves II through XII are normal  PSY: stable mood.   Other systems also examined,  grossly normal.       Assessment/Plan     Aphasia  Assessment & Plan  Word finding difficulties, resolved  Lack of fluency and repetition resolved  Intact comprehension  Associated with transient right temporal visual field blurring which is no longer present  CT cerebral perfusion analysis showed a T-max of more than 6 seconds likely representing combination of complete infarct and ischemia.  Awaiting neurology opinion after results of MRI scan of the head with and without contrast-which was negative, with no evidence of acute infarction in the brain parenchyma. NIH score on presentation was 2        TIA (transient ischemic attack)  Assessment & Plan  Sudden onset right temporal blurring of vision with word finding difficulty  Reverted to baseline in 2 hours.  No aura, shaking, loc or trauma  Possible TIA  Q4hr neuro checks.  Telemetry.  Permissive Hypertension at least 24hrs post event, allow <220/<120  Aspirin 325mg daily, plus started high intensity atorvastatin 80 mg  Statin full dose high-potency LDL <70 GOAL  Check labs: Hgb A1C, lipid profile, sed rate, TSH, troponin, UA  MRI is read Awaited, echocardiogram shows a right to left shunt around the fifth or sixth cardiac cycle suggestive of pulmonary arteriovenous malformation possible intracardiac shunt PFO and an ejection fraction of 75% with normal regional wall motion and normal diastolic function and an RVSP of 30 mmHg.   Cardiologist Dr. Watts was consulted who suggested probable PFO, since no infarct and is incidental or if it might be a pulmonary AVM is of no significance with no cardiopulmonary signs and symptoms or telangiectasias syndromes.

## 2019-08-28 NOTE — PROGRESS NOTES
Hospital Neurology Progress Note:     Interval History: No acute events overnight. Headache resolved.     Objective:   Vitals:    08/28/19 0400 08/28/19 0639 08/28/19 0800 08/28/19 1125   BP: (!) 97/48  (!) 97/46 (!) 97/44   Pulse: 62  (!) 58 60   Resp: 16  16 16   Temp: 36.9 °C (98.4 °F)  36.7 °C (98 °F) 36.7 °C (98 °F)   TempSrc: Temporal  Temporal Temporal   SpO2: 93%  95% 98%   Weight:  77.1 kg (169 lb 15.6 oz)     Height:           Labs:     Lab Results   Component Value Date/Time    PROTHROMBTM 12.7 08/27/2019 09:16 PM    INR 0.94 08/27/2019 09:16 PM      Lab Results   Component Value Date/Time    WBC 11.6 (H) 08/28/2019 05:04 AM    RBC 4.39 08/28/2019 05:04 AM    HEMOGLOBIN 14.1 08/28/2019 05:04 AM    HEMATOCRIT 42.3 08/28/2019 05:04 AM    MCV 96.4 08/28/2019 05:04 AM    MCH 32.1 08/28/2019 05:04 AM    MCHC 33.3 (L) 08/28/2019 05:04 AM    MPV 9.6 08/28/2019 05:04 AM    NEUTSPOLYS 36.70 (L) 08/27/2019 09:16 PM    LYMPHOCYTES 46.40 (H) 08/27/2019 09:16 PM    MONOCYTES 7.40 08/27/2019 09:16 PM    EOSINOPHILS 7.90 (H) 08/27/2019 09:16 PM    BASOPHILS 1.40 08/27/2019 09:16 PM      Lab Results   Component Value Date/Time    SODIUM 137 08/28/2019 05:04 AM    POTASSIUM 3.5 (L) 08/28/2019 05:04 AM    CHLORIDE 110 08/28/2019 05:04 AM    CO2 19 (L) 08/28/2019 05:04 AM    GLUCOSE 100 (H) 08/28/2019 05:04 AM    BUN 9 08/28/2019 05:04 AM    CREATININE 0.77 08/28/2019 05:04 AM      Lab Results   Component Value Date/Time    CHOLSTRLTOT 195 08/28/2019 05:04 AM     (H) 08/28/2019 05:04 AM    HDL 39 (A) 08/28/2019 05:04 AM    TRIGLYCERIDE 100 08/28/2019 05:04 AM       Lab Results   Component Value Date/Time    ALKPHOSPHAT 67 08/27/2019 09:16 PM    ASTSGOT 20 08/27/2019 09:16 PM    ALTSGPT 17 08/27/2019 09:16 PM    TBILIRUBIN 0.5 08/27/2019 09:16 PM        Imaging/Testing:   MR Brain W/WO CST on 8/28/19 personally reviewed and was WNL.     CTA head/neck reviewed in chart and were WNL.    Echocardiogram reviewed in chart.  "    Physical Exam:     General: Well appearing 37 y/o female in bed in NAD  Skin: Warm, dry, no rashes or lesions   Psychiatric: Appropriate affect. No active psychosis.  HEENT: Atraumatic head, normal sclera and conjunctiva, moist oral mucosa. No lid lag.    Neurologic:  Mental Status:  AAOx4. Able to follow commands/cross midline. Speech fluent/nondysarthric. Language functions intact. No neglect/apraxia.  Cranial Nerves:  PERRL. EOMi. Face symmetric, palate/tongue midline. Visual fields full to confrontation. Facial sensation intact.   Motor: Moves all 4 extremities symmetrically.   Reflexes: Deferred  Coordination: Observed making coordinated movements reaching for objects in room.  Sensation: Deferred  Gait/Station: Deferred    Assessment/Plan:    Stefania Garrison is a 38 y.o. female with history of tobacco abuse presenting to the hospital for speech difficulty/headache and consulted for possible stroke. Patient had a throbbing holocephalic headache with associated nausea, and an associated visual phenomena consisting of a \"grey spot\" in her vision which was also \"squiggly\". This resolved spontaneously, and MR Brain W/WO CST was unremarkable for ischemia. She had been undergoing a lot of stress recently w/ job changes and moving to the state as well as with her children. She endorses hx of headaches in the past of similar quality but w/o visual phenomena. She also states that during her headache she had difficulty getting words out. At this time, the patient's symptoms are most c/w a complex migraine given the focal features, and normal MRI. The episode is not suggestive of TIA, as with a duration of symptoms of >1 day, one would expect ischemia to be seen on MRI if this was the etiology.     Plan:  -Episode c/w migraine.  -Recommended lifestyle modifications (adequate sleep, avoid fasting, managing stress etc)  -For acute headache can manage with NSAIDs.  -Will be seen in the neuro clinic as outpt for " headaches.  -All others per cardiology/internal medicine.  -Neurology signing off.  -Plan discussed with consulting physician and patient's nurse.     Jim Puente M.D., Diplomat of the American Board of Psychiatry and Neurology  Diplomat of [facility]   Assistant Clinical Professor, [facility] Neurology Consultant

## 2019-08-28 NOTE — CARE PLAN
Problem: Infection  Goal: Will remain free from infection  Outcome: PROGRESSING AS EXPECTED     Problem: Pain Management  Goal: Pain level will decrease to patient's comfort goal  Outcome: PROGRESSING AS EXPECTED

## 2019-08-28 NOTE — ASSESSMENT & PLAN NOTE
Sudden onset right temporal blurring of vision with word finding difficulty  Reverted to baseline in 2 hours.  No aura, shaking, loc or trauma  Possible TIA  Q4hr neuro checks.  Telemetry.  Permissive Hypertension at least 24hrs post event, allow <220/<120  Aspirin 325mg daily, plus started high intensity atorvastatin 80 mg  Statin full dose high-potency LDL <70 GOAL  Check labs: Hgb A1C, lipid profile, sed rate, TSH, troponin, UA  MRI is read Awaited, echocardiogram shows a right to left shunt around the fifth or sixth cardiac cycle suggestive of pulmonary arteriovenous malformation possible intracardiac shunt PFO and an ejection fraction of 75% with normal regional wall motion and normal diastolic function and an RVSP of 30 mmHg.   Cardiologist Dr. Watts was consulted who suggested probable PFO, since no infarct and is incidental or if it might be a pulmonary AVM is of no significance with no cardiopulmonary signs and symptoms or telangiectasias syndromes.

## 2019-08-28 NOTE — CARE PLAN
Problem: Communication  Goal: The ability to communicate needs accurately and effectively will improve  Outcome: PROGRESSING AS EXPECTED     Problem: Knowledge Deficit  Goal: Knowledge of disease process/condition, treatment plan, diagnostic tests, and medications will improve  Outcome: PROGRESSING AS EXPECTED  Note:   Stroke education book gone over with Pt and left at bedside.      Problem: Pain Management  Goal: Pain level will decrease to patient's comfort goal  Outcome: PROGRESSING AS EXPECTED

## 2019-08-28 NOTE — DISCHARGE SUMMARY
Internal Medicine Discharge Summary  Note Author: Megan Muniz M.D.       Name Stefania Garrison 1980   Age/Sex 38 y.o. female   MRN [id number]         Admit Date:  2019       Discharge Date: 2019    Service:   [facility] Internal Medicine blue Team  Attending Physician(s):       Senior Resident(s):    Fid[place]io Resident(s):  Dr.Magin Jones  PCP: Pcp Pt States None      Primary Diagnosis:   Complex migraine rule out transient ischemic attack/stroke    Secondary Diagnoses:                Active Problems:    TIA (transient ischemic attack) POA: Unknown    Aphasia POA: Unknown  Resolved Problems:    * No resolved hospital problems. *      Hospital Summary (Brief Narrative):       Patient is a 38-year-old female who presented to the emergency department on 2019 at [facility] with difficulty talking and having expressive aphasia, which was associated with a headache, and having visual field defect on the right side transiently before presentation.  Patient was seen in the emergency department by the neurology team who did not think that the candidate was a suitable for TPA treatment due to unknown time of onset of the symptoms and the possibility of complex migraine high possibility.  Patient was admitted on the medical floor for complete stroke work-up, which included a brain MRI with and without contrast, echocardiogram and a lipid profile and she was started on aspirin after swallow evaluation in the emergency department, and also evaluation by speech therapist.  Patient was counseled about her history of heavy smoking.  Patient was started on high intensity statin, atorvastatin 80 mg once daily and 325 mg of aspirin, and admitted on the medical floor for neuro observation.  Patient had an MRI of the brain on 2019 without and with contrast which was within normal limits with no evidence of acute infarction in the brain parenchyma and a  chronic sphenoid and ethmoid sinusitis.  CT angiogram of the [place] was within normal limits.  Along with CT angiogram of the neck which was within normal limits at the time of presentation, CT scan of the head without contrast was normal.  CT cerebral perfusion analysis on 8/27/2019 showed a T-max more than 6 seconds, likely representing a combination of completed infarct and ischemia= 10 mL.  Echocardiogram done on 8/28/2019, reveals dilated inferior vena cava with inspiratory collapse.  And a right to left shunt noted around the fifth or sixth cardiac cycle suggestive of pulmonary arteriovenous malformation, with the possibility of an intracardiac shunt PFO.  RVSP was 30 mmHg.  Case discussed with Dr. Watts of cardiology who suggested probable PFO since he did not have an infarct and is incidental or if it might be a pulmonary AVM, is of no significance with no cardiopulmonary signs or symptoms and no telangiectasia syndromes.  She is advised to follow-up with her PCP, for follow-up for any signs and symptoms.  Case D/W ,neurology,with advice to discharge patient,on tylenolol,for headaches and to follow up with PCP for hyperlipidemia.Neurology will follow up for Complex Migraine in their clinic.    Patient /Hospital Summary (Details -- Problem Oriented) :          Aphasia  Assessment & Plan  Word finding difficulties, resolved  Lack of fluency and repetition resolved  Intact comprehension  Associated with transient right temporal visual field blurring which is no longer present  CT cerebral perfusion analysis showed a T-max of more than 6 seconds likely representing combination of complete infarct and ischemia.  Awaiting neurology opinion after results of MRI scan of the head with and without contrast-which was negative, with no evidence of acute infarction in the brain parenchyma. NIH score on presentation was 2        TIA (transient ischemic attack)  Assessment & Plan  Sudden onset right  temporal blurring of vision with word finding difficulty  Reverted to baseline in 2 hours.  No aura, shaking, loc or trauma  Possible TIA  Q4hr neuro checks.  Telemetry.  Permissive Hypertension at least 24hrs post event, allow <220/<120  Aspirin 325mg daily, plus started high intensity atorvastatin 80 mg  Statin full dose high-potency LDL <70 GOAL  Check labs: Hgb A1C, lipid profile, sed rate, TSH, troponin, UA  MRI is read Awaited, echocardiogram shows a right to left shunt around the fifth or sixth cardiac cycle suggestive of pulmonary arteriovenous malformation possible intracardiac shunt PFO and an ejection fraction of 75% with normal regional wall motion and normal diastolic function and an RVSP of 30 mmHg.   Cardiologist Dr. Watts was consulted who suggested probable PFO, since no infarct and is incidental or if it might be a pulmonary AVM is of no significance with no cardiopulmonary signs and symptoms or telangiectasias syndromes.      Consultants:     Neurology.  Cardiology    Procedures:        None    Imaging/ Testing:      See in hospital summary    Discharge Medications:         Medication Reconciliation: Completed       Medication List      START taking these medications      Instructions   acetaminophen 325 MG Tabs  Commonly known as:  TYLENOL   Take 2 Tabs by mouth every 6 hours as needed (Mild Pain; (Pain scale 1-3); Temp greater than 100.5 F).  Dose:  650 mg     atorvastatin 80 MG tablet  Commonly known as:  LIPITOR   Take 0.5 Tabs by mouth every evening.  Dose:  40 mg     nicotine 14 MG/24HR Pt24  Commonly known as:  NICODERM   Apply 14 Patches to skin as directed Continuous for 14 days.  Dose:  14 Patch            Can use .DISCHARGEMEDSLIST if going to another facility         Disposition: For home    Diet: As tolerated    Activity: As tolerated    Instructions:      Patient to keep up appointment with primary care practitioner.    The patient was instructed to return to the ER in the event of  worsening symptoms. I have counseled the patient on the importance of compliance and the patient has agreed to proceed with all medical recommendations and follow up plan indicated above.   The patient understands that all medications come with benefits and risks. Risks may include permanent injury or death and these risks can be minimized with close reassessment and monitoring.        Primary Care Provider:  Pcp Pt States None    Discharge summary faxed to primary care provider:  Completed  Copy of discharge summary given to the patient: Completed      Follow up appointment details :   Follow-up with neurology, for evaluation of complex migraine.  Follow-up with primary care practitioner, for monitoring of lipid levels, and for recurrence of signs and symptoms, of headaches.       Follow up in 2 weeks time  [facility]  [id number]  [place] [id number] [phone]    Please call to establish care with a new primary care provider. Thank you.        Pending Studies:        None    Time spent on discharge day patient visit, preparing discharge paperwork and arranging for patient follow up.    Summary of follow up issues:   Follow-up with neurology, for evaluation of complex migraine.  Follow-up with primary care practitioner for monitoring of lipid levels and for recurrence of signs and symptoms of headaches.    Discharge Time (Minutes) : 45 minutes  Hospital Course Type: Inpatient Stay < 2 midnights, patient recovered more rapidly than anticipated      Condition on Discharge stable  ______________________________________________________________________    Interval history/exam for day of discharge:    Uncomfortable, denies any signs and symptoms of headaches,, weakness, blurring of vision, nausea or vomiting, photophobia or phonophobia.  Patient comfortable with no other issues seen.      Most Recent Labs:    Lab Results   Component Value Date/Time    WBC 11.6 (H) 08/28/2019 05:04 AM     RBC 4.39 08/28/2019 05:04 AM    HEMOGLOBIN 14.1 08/28/2019 05:04 AM    HEMATOCRIT 42.3 08/28/2019 05:04 AM    MCV 96.4 08/28/2019 05:04 AM    MCH 32.1 08/28/2019 05:04 AM    MCHC 33.3 (L) 08/28/2019 05:04 AM    MPV 9.6 08/28/2019 05:04 AM    NEUTSPOLYS 36.70 (L) 08/27/2019 09:16 PM    LYMPHOCYTES 46.40 (H) 08/27/2019 09:16 PM    MONOCYTES 7.40 08/27/2019 09:16 PM    EOSINOPHILS 7.90 (H) 08/27/2019 09:16 PM    BASOPHILS 1.40 08/27/2019 09:16 PM      Lab Results   Component Value Date/Time    SODIUM 137 08/28/2019 05:04 AM    POTASSIUM 3.5 (L) 08/28/2019 05:04 AM    CHLORIDE 110 08/28/2019 05:04 AM    CO2 19 (L) 08/28/2019 05:04 AM    GLUCOSE 100 (H) 08/28/2019 05:04 AM    BUN 9 08/28/2019 05:04 AM    CREATININE 0.77 08/28/2019 05:04 AM      Lab Results   Component Value Date/Time    ALTSGPT 17 08/27/2019 09:16 PM    ASTSGOT 20 08/27/2019 09:16 PM    ALKPHOSPHAT 67 08/27/2019 09:16 PM    TBILIRUBIN 0.5 08/27/2019 09:16 PM    ALBUMIN 4.6 08/27/2019 09:16 PM    GLOBULIN 3.5 08/27/2019 09:16 PM    INR 0.94 08/27/2019 09:16 PM     Lab Results   Component Value Date/Time    PROTHROMBTM 12.7 08/27/2019 09:16 PM    INR 0.94 08/27/2019 09:16 PM

## 2019-08-28 NOTE — PROGRESS NOTES
2 RN skin check with ARNAUD Saavedra.  Bony prominences pink, blanching and intact.  Left AC area has small bruise from IV poke in ED.   All other skin WDL.

## 2019-08-28 NOTE — NON-PROVIDER
Internal Medicine Medical Student Admitting History and Physical  Note Author: Carol Arenas, Student    Name Stefania Garrison     1980   Age/Sex 38 y.o. female   MRN [id number]   Code Status Full Code        Chief Complaint:  Speech difficulties, blurred vision, and headache     HPI:  Stefania Garrison is a 38 year old female who presented to the ED after sudden onset speech difficulties, blurred vision, and headache. Patient states that she was eating dinner around 8pm when she suddenly experienced word finding problems and slurred speech. She also describes blurred vision stating that she had an area in her right peripheral vision that was obscured by a non-strobing blurry [place]. Patient describes the headache associated with the event as severe throbbing behind her eyes. Patient denies numbness, tingling, or focal weakness associated with this event. Per patient, these symptoms lasted for ~2 hours. Patient notes that she had a similar visual episode two days ago that lasted for ~15 minutes but did not have a headache or speech difficulties at that time. Patient reports feeling generally well leading up to this event but notes that she has been under increased stressed over the past several weeks as she just moved here from [place] and started a new job. Patient denies a history of migraines and clotting disorders and states she is generally healthy with no chronic medical problems.    ED vitals   BP-171/95 pulse 110 pulse ox 98% T98.1 Resp 20     NIH stroke scale 2, patient given tylenol and torolac for headache in ED. Neurology decided against TPA    CBC and CMP unremarkable.   PT 12.7, PTT 28.3, INR 0.94     Serial troponin 6 and <6 (normal). EKG Rate 91, NSR, Normal Axis, Normal Intervals, no signs of ischemia     UA- unremarkable     CT normal- no signs of hemorrhage, mass, or obvious ischemia  CTA of head and neck within normal limits   CT perfusion scan shows some possible small areas of  ischemia     Echo- EF ~75. There is a right to left shunt noted suggestive of either pulmonary arterio-venous malformation or PFO. Dilated Vena cava     Lipid panel- 195 Cho, 100 Trig, 39 HDL, 136 LDL   TSH- 4.360       Review of Systems   Constitutional: Negative for fever, malaise/fatigue and weight loss.   HENT: Negative for congestion and sore throat.         No hearing changes    Eyes: Positive for blurred vision and double vision.   Respiratory: Negative for cough and shortness of breath.    Cardiovascular: Negative for chest pain, palpitations and leg swelling.   Gastrointestinal: Negative for abdominal pain, constipation, diarrhea, nausea and vomiting.   Genitourinary: Negative for dysuria.   Musculoskeletal: Negative for myalgias.        Hand swelling present upon wakening, improves with day   Skin: Negative for rash.   Neurological: Positive for speech change (Word finding difficulties and slurred speech) and headaches (Behind eyes, dull pressure ). Negative for dizziness, tingling, focal weakness, loss of consciousness and weakness.   Endo/Heme/Allergies: Does not bruise/bleed easily.   Psychiatric/Behavioral: Negative for depression.        Increased stress              Past Medical History (chronic problems, known complications, current management)     Patient denies medical history   No daily medications or supplements     Past Surgical History:  Cholecystectomy 2018        Medication Allergy/Sensitivities:  No Known allergies     Family History:  Unknown     Social History:  Smokin-2 PPD for 20 years   Alcohol: 2-3 drinks per week   Illictis: Marijuana tried many years ago, Never used stimulants   Living situation: Lives with  and her children       Physical Exam     Vitals:    19 0030 19 0202 19 0400 19 0639   BP: 104/45 (!) 92/51 (!) 97/48    Pulse: 77 70 62    Resp:  18 16    Temp:  36.7 °C (98 °F) 36.9 °C (98.4 °F)    TempSrc:  Temporal Temporal    SpO2:  "95% 93% 93%    Weight:    77.1 kg (169 lb 15.6 oz)   Height:         Body mass index is 31.09 kg/m².  BP (!) 97/48 Comment: RN notified  Pulse 62   Temp 36.9 °C (98.4 °F) (Temporal)   Resp 16   Ht 1.575 m (5' 2\")   Wt 77.1 kg (169 lb 15.6 oz)   LMP 08/13/2019 (Approximate)   SpO2 93%   BMI 31.09 kg/m²   O2 therapy: Pulse Oximetry: 93 %, O2 (LPM): 0, O2 Delivery: None (Room Air)    Physical Exam   Constitutional: She is oriented to person, place, and time and well-developed, well-nourished, and in no distress.   HENT:   Head: Normocephalic and atraumatic.   Mouth/Throat: Oropharynx is clear and moist.   MP 1   Eyes: Pupils are equal, round, and reactive to light. EOM are normal.   Neck: Neck supple.   Cardiovascular: Normal rate, regular rhythm and normal heart sounds.   Pulmonary/Chest: Effort normal and breath sounds normal. No respiratory distress. She has no wheezes. She has no rales.   Abdominal: Soft. She exhibits no distension. There is no tenderness.   Musculoskeletal: She exhibits no edema.   Lymphadenopathy:     She has no cervical adenopathy.   Neurological: She is alert and oriented to person, place, and time. She has normal sensation, normal strength, normal reflexes and intact cranial nerves. Coordination normal.       Assessment/Plan     Speech difficulties, blurred vision, and headache  Patients presentation of speech difficulties, blurred vision and severe headache in setting of increased life stressors and normal CT/CTA most consistent with complex migraine headaches however TIA or MS presentation cannot be ruled out at this time. Risk factor for stroke are heavy smoking. Sed rate normal, unlikely to be vasculitis.    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

## 2019-08-28 NOTE — PROGRESS NOTES
1030:  Assumed care of patient at 0700.  Report received at bedside.  NIHSS completed with ARNAUD Romero.  Patient denies pain this AM.  Echo completed.  Awaiting MRI.  Possible discharge home later today.    1600:  Patient is requesting to leave AMA and stating she will do her follow-ups outpatient.  AMARI William paged twice.    1620:  On third page to [facility], received call back from Dr. Wheeler.  Informed him that the patient was leaving AMA.  Dr stated that they would be discharging patient in the next 1-2 hours.  RN informed MD that the patient would leave AMA if she did not speak to him directly.  MD on his way to the bedside.  States that he will be here in 15-20 minutes.  Patient informed and agreed to wait.

## 2019-08-28 NOTE — H&P
Internal Medicine Admitting History and Physical    Note Author: Kristi Hernández M.D.       Name Stefania Garrison     1980   Age/Sex 38 y.o. female   MRN [id number]   Code Status Full     After 5PM or if no immediate response to page, please call for cross-coverage  Attending/Team: Blue See Patient List for primary contact information  Call [phone] to page    1st Call - Day Intern (R1):   Dr. Jones 2nd Call - Day Sr. Resident (R2/R3):   Dr. Muniz       Chief Complaint:   Blurred vision and Word finding difficulties    HPI:  Ms Garrison is a 37 y/o female with no significant pmh apart from chronic smoking, who came to ER after she had blurring of vision and word finding difficulties.    The patient was having dinner with her family at around 8 pm when she developed a sudden  Right temporal visual field blurring followed by slurring of speech noticed by her . She expressed it was associated with word finding difficulties where her mind could not communicate the words.    She denied any aura or autonomic changes before or after the episode. She denied any head trauma, fever, sleep deprivation, alcohol use, changes in medication or new medications, loc,weakness, tongue bite or urinary incontinence. She expressed she has been extremely stressed out as she moved from [place] to [place] 3 months ago.    In the ED, patient tachycardic 110, /95, RR 20, saturating well on room air. Neurology NIHSS 2 for visual field defect and aphasia. Patient not given tPA. CT head, CTA head, neck, and perfusion imaging show no abnormalities.    Review of Systems   Constitutional: Negative for chills, diaphoresis, fever, malaise/fatigue and weight loss.   HENT: Negative for congestion, ear discharge, ear pain, hearing loss, nosebleeds, sinus pain, sore throat and tinnitus.    Eyes: Negative for blurred vision, double vision, photophobia, pain, discharge and redness.   Respiratory: Negative for cough,  hemoptysis, sputum production, shortness of breath, wheezing and stridor.    Cardiovascular: Negative for chest pain, palpitations, orthopnea, claudication, leg swelling and PND.   Gastrointestinal: Negative for abdominal pain, blood in stool, constipation, diarrhea, heartburn, melena, nausea and vomiting.   Genitourinary: Negative for dysuria, flank pain, frequency, hematuria and urgency.   Musculoskeletal: Negative for back pain, myalgias and neck pain.   Skin: Negative for itching and rash.   Neurological: Positive for headaches. Negative for dizziness, tingling, tremors, sensory change, speech change, focal weakness, seizures, loss of consciousness and weakness.        Mild frontal headache 2/10 eversince she underwent CT studies today.   Endo/Heme/Allergies: Does not bruise/bleed easily.   Psychiatric/Behavioral: Negative for depression, hallucinations, substance abuse and suicidal ideas. The patient is not nervous/anxious and does not have insomnia.              Past Medical History (Chronic medical problem, known complications and current treatment)    none    Past Surgical History:  Past Surgical History:   Procedure Laterality Date   • CHOLECYSTECTOMY  2018       Current Outpatient Medications:  Home Medications     Reviewed by Lisbeth Denise (Pharmacy Tech) on 08/27/19 at 2242  Med List Status: Complete   Medication Last Dose Status        Patient German Taking any Medications                       Medication Allergy/Sensitivities:  Allergies   Allergen Reactions   • Pcn [Penicillins] Anaphylaxis         Family History (mandatory)   History reviewed. No pertinent family history.    Social History (mandatory)   Social History     Socioeconomic History   • Marital status:      Spouse name: Not on file   • Number of children: Not on file   • Years of education: Not on file   • Highest education level: Not on file   Occupational History   • Not on file   Social Needs   • Financial resource strain: Not  on file   • Food insecurity:     Worry: Not on file     Inability: Not on file   • Transportation needs:     Medical: Not on file     Non-medical: Not on file   Tobacco Use   • Smoking status: Never Smoker   • Smokeless tobacco: Never Used   Substance and Sexual Activity   • Alcohol use: Yes     Frequency: 2-4 times a month     Drinks per session: 1 or 2     Binge frequency: Never   • Drug use: Never   • Sexual activity: Not on file   Lifestyle   • Physical activity:     Days per week: Not on file     Minutes per session: Not on file   • Stress: Not on file   Relationships   • Social connections:     Talks on phone: Not on file     Gets together: Not on file     Attends Denominational service: Not on file     Active member of club or organization: Not on file     Attends meetings of clubs or organizations: Not on file     Relationship status: Not on file   • Intimate partner violence:     Fear of current or ex partner: Not on file     Emotionally abused: Not on file     Physically abused: Not on file     Forced sexual activity: Not on file   Other Topics Concern   • Not on file   Social History Narrative   • Not on file     Living situation: with family in [place]  PCP : Pcp Pt States None    Physical Exam     Vitals:    08/27/19 2330 08/28/19 0000 08/28/19 0030 08/28/19 0202   BP: (!) 91/57 114/46 104/45 (!) 92/51   Pulse: 84 68 77 70   Resp:    18   Temp:    36.7 °C (98 °F)   TempSrc:    Temporal   SpO2: 97% 97% 95% 93%   Weight:       Height:         Body mass index is 31.09 kg/m².  O2 therapy: Pulse Oximetry: 93 %, O2 (LPM): 0, O2 Delivery: None (Room Air)    Physical Exam   Constitutional: She is oriented to person, place, and time and well-developed, well-nourished, and in no distress.   HENT:   Head: Normocephalic and atraumatic.   Mouth/Throat: Oropharynx is clear and moist. No oropharyngeal exudate.   Eyes: Pupils are equal, round, and reactive to light. Conjunctivae and EOM are normal. Right eye exhibits no  discharge. Left eye exhibits no discharge.   Neck: Normal range of motion. Neck supple.   Cardiovascular: Normal rate, regular rhythm and normal heart sounds. Exam reveals no gallop and no friction rub.   No murmur heard.  Pulmonary/Chest: Effort normal and breath sounds normal. No stridor. No respiratory distress. She has no wheezes. She has no rales. She exhibits no tenderness.   Abdominal: Soft. Bowel sounds are normal. She exhibits no distension. There is no tenderness. There is no rebound and no guarding.   Musculoskeletal: Normal range of motion. She exhibits no edema or deformity.   Lymphadenopathy:     She has no cervical adenopathy.   Neurological: She is alert and oriented to person, place, and time. She has normal reflexes. She displays normal reflexes. No cranial nerve deficit. She exhibits normal muscle tone. Gait normal. Coordination normal.   Skin: Skin is warm. No rash noted. She is not diaphoretic. No erythema. No pallor.   Psychiatric: Affect normal.         Data Review       Old Records Request:   Deferred  Current Records review/summary: Completed    Lab Data Review:  Recent Results (from the past 24 hour(s))   ACCU-CHEK GLUCOSE    Collection Time: 08/27/19  9:08 PM   Result Value Ref Range    Glucose - Accu-Ck 82 65 - 99 mg/dL   CBC WITH DIFFERENTIAL    Collection Time: 08/27/19  9:16 PM   Result Value Ref Range    WBC 10.2 4.8 - 10.8 K/uL    RBC 4.64 4.20 - 5.40 M/uL    Hemoglobin 15.3 12.0 - 16.0 g/dL    Hematocrit 44.0 37.0 - 47.0 %    MCV 94.8 81.4 - 97.8 fL    MCH 33.0 27.0 - 33.0 pg    MCHC 34.8 33.6 - 35.0 g/dL    RDW 43.9 35.9 - 50.0 fL    Platelet Count 357 164 - 446 K/uL    MPV 9.7 9.0 - 12.9 fL    Neutrophils-Polys 36.70 (L) 44.00 - 72.00 %    Lymphocytes 46.40 (H) 22.00 - 41.00 %    Monocytes 7.40 0.00 - 13.40 %    Eosinophils 7.90 (H) 0.00 - 6.90 %    Basophils 1.40 0.00 - 1.80 %    Immature Granulocytes 0.20 0.00 - 0.90 %    Nucleated RBC 0.00 /100 WBC    Neutrophils (Absolute)  3.75 2.00 - 7.15 K/uL    Lymphs (Absolute) 4.73 1.00 - 4.80 K/uL    Monos (Absolute) 0.75 0.00 - 0.85 K/uL    Eos (Absolute) 0.81 (H) 0.00 - 0.51 K/uL    Baso (Absolute) 0.14 (H) 0.00 - 0.12 K/uL    Immature Granulocytes (abs) 0.02 0.00 - 0.11 K/uL    NRBC (Absolute) 0.00 K/uL   COMP METABOLIC PANEL    Collection Time: 08/27/19  9:16 PM   Result Value Ref Range    Sodium 136 135 - 145 mmol/L    Potassium 3.8 3.6 - 5.5 mmol/L    Chloride 105 96 - 112 mmol/L    Co2 20 20 - 33 mmol/L    Anion Gap 11.0 0.0 - 11.9    Glucose 92 65 - 99 mg/dL    Bun 7 (L) 8 - 22 mg/dL    Creatinine 0.71 0.50 - 1.40 mg/dL    Calcium 9.9 8.5 - 10.5 mg/dL    AST(SGOT) 20 12 - 45 U/L    ALT(SGPT) 17 2 - 50 U/L    Alkaline Phosphatase 67 30 - 99 U/L    Total Bilirubin 0.5 0.1 - 1.5 mg/dL    Albumin 4.6 3.2 - 4.9 g/dL    Total Protein 8.1 6.0 - 8.2 g/dL    Globulin 3.5 1.9 - 3.5 g/dL    A-G Ratio 1.3 g/dL   PROTHROMBIN TIME    Collection Time: 08/27/19  9:16 PM   Result Value Ref Range    PT 12.7 12.0 - 14.6 sec    INR 0.94 0.87 - 1.13   APTT    Collection Time: 08/27/19  9:16 PM   Result Value Ref Range    APTT 28.3 24.7 - 36.0 sec   COD (ADULT)    Collection Time: 08/27/19  9:16 PM   Result Value Ref Range    ABO Grouping Only O     Rh Grouping Only POS     Antibody Screen-Cod NEG    TROPONIN    Collection Time: 08/27/19  9:16 PM   Result Value Ref Range    Troponin T 6 6 - 19 ng/L   ESTIMATED GFR    Collection Time: 08/27/19  9:16 PM   Result Value Ref Range    GFR If African American >60 >60 mL/min/1.73 m 2    GFR If Non African American >60 >60 mL/min/1.73 m 2   Magnesium    Collection Time: 08/27/19  9:16 PM   Result Value Ref Range    Magnesium 2.1 1.5 - 2.5 mg/dL   ABO Rh Confirm    Collection Time: 08/27/19  9:17 PM   Result Value Ref Range    ABO Rh Confirm O POS    EKG (NOW)    Collection Time: 08/27/19  9:50 PM   Result Value Ref Range    Report       [facility] Emergency Dept.    Test Date:  2019-08-27  Pt Name:     JOCELYN WILDE               Department: ER  MRN:        [id number]                      Room:       [facility]  Gender:     Female                       Technician: [id number]  :        1980                   Requested By:SUDARSHAN VITAL  Order #:    [id number]                    Reading MD:    Measurements  Intervals                                Axis  Rate:       91                           P:          27  MA:         164                          QRS:        34  QRSD:       86                           T:          51  QT:         356  QTc:        439    Interpretive Statements  SINUS RHYTHM  No previous ECG available for comparison     URINALYSIS CULTURE, IF INDICATED    Collection Time: 19  1:57 AM   Result Value Ref Range    Color Yellow     Character Clear     Ph 6.0 5.0 - 8.0    Glucose Negative Negative mg/dL    Ketones Negative Negative mg/dL    Protein Negative Negative mg/dL    Bilirubin Negative Negative    Urobilinogen, Urine 1.0 Negative    Nitrite Negative Negative    Leukocyte Esterase Negative Negative    Occult Blood Negative Negative    Micro Urine Req see below    REFRACTOMETER SG    Collection Time: 19  1:57 AM   Result Value Ref Range    Specific Gravity >=1.045        Imaging/Procedures Review:    Independant Imaging Review: Completed  DX-CHEST-PORTABLE (1 VIEW)   Final Result      No acute cardiopulmonary abnormality.      CT-CTA HEAD WITH & W/O-POST PROCESS   Final Result      CT angiogram of the [place] within normal limits.      CT-CTA NECK WITH & W/O-POST PROCESSING   Final Result      CT angiogram of the neck within normal limits.      CT-CEREBRAL PERFUSION ANALYSIS   Final Result      1.  Cerebral blood flow less than 30% likely representing completed infarct = 0 mL.      2.  T Max more than 6 seconds likely representing combination of completed infarct and ischemia = 10 mL.      3.  Mismatched volume likely representing ischemic brain/penumbra = 10 mL      4.  Please  note that the cerebral perfusion was performed on the limited brain tissue around the basal ganglia region. Infarct/ischemia outside the CT perfusion sections can be missed in this study.      CT-HEAD W/O   Final Result      Normal CT scan of the head without contrast.               INTERPRETING LOCATION:  [address], [place], [address]      EC-ECHOCARDIOGRAM COMPLETE W/O CONT    (Results Pending)              EKG:   EKG Independent Review: Completed  QTc:439, HR: 91, Normal Sinus Rhythm, no ST/T changes     Records reviewed and summarized in current documentation :  Yes           Assessment/Plan     Aphasia  Assessment & Plan  Word finding difficulties  Lack of fluency and repetition  Intact comprehension  Associated with transient right temporal visual field blurring   Rule out TIA, anoxic brain injury      TIA (transient ischemic attack)  Assessment & Plan  Sudden onset right temporal blurring of vision with word finding difficulty  Reverted to baseline in 2 hours  No aura, shaking, loc or trauma  Possible TIA  Q4hr neuro checks.  Telemetry.  Permissive Hypertension at least 24hrs post event, allow <220/<120  Aspirin 325mg daily. HOLD ANTICOAGULATION.  Statin full dose high-potency LDL <70 GOAL  Check labs: Hgb A1C, lipid profile, sed rate, TSH, troponin, UA  Neuro recommended MRI and Echo      Anticipated Hospital stay:  >2 midnights        Quality Measures  Quality-Core Measures  PCP: Pcp Pt States None

## 2019-08-28 NOTE — ED NOTES
Med rec updated and complete. Allergies reviewed. Pt denies  Taking medications.  Home pharmacy [facility].  No antibiotic use in last 14 days.

## 2019-08-29 NOTE — DISCHARGE INSTRUCTIONS
Discharge Instructions    Discharged to home by car with relative. Discharged via wheelchair, hospital escort: Refused.  Special equipment needed: Not Applicable    Be sure to schedule a follow-up appointment with your primary care doctor or any specialists as instructed.     Discharge Plan:   Smoking Cessation Offered: Patient Refused  Influenza Vaccine Indication: Not indicated: Previously immunized this influenza season and > 8 years of age    I understand that a diet low in cholesterol, fat, and sodium is recommended for good health. Unless I have been given specific instructions below for another diet, I accept this instruction as my diet prescription.   Other diet: Regular    Special Instructions: None    · Is patient discharged on Warfarin / Coumadin?   No     Depression / Suicide Risk    As you are discharged from this [facility] facility, it is important to learn how to keep safe from harming yourself.    Recognize the warning signs:  · Abrupt changes in personality, positive or negative- including increase in energy   · Giving away possessions  · Change in eating patterns- significant weight changes-  positive or negative  · Change in sleeping patterns- unable to sleep or sleeping all the time   · Unwillingness or inability to communicate  · Depression  · Unusual sadness, discouragement and loneliness  · Talk of wanting to die  · Neglect of personal appearance   · Rebelliousness- reckless behavior  · Withdrawal from people/activities they love  · Confusion- inability to concentrate     If you or a loved one observes any of these behaviors or has concerns about self-harm, here's what you can do:  · Talk about it- your feelings and reasons for harming yourself  · Remove any means that you might use to hurt yourself (examples: pills, rope, extension cords, firearm)  · Get professional help from the community (Mental Health, Substance Abuse, psychological counseling)  · Do not be alone:Call your Safe  Contact- someone whom you trust who will be there for you.  · Call your local CRISIS HOTLINE 979-6763 or 581-651-2337  · Call your local Children's Mobile Crisis Response Team [place] (511) 228-2108 or www.[employer] Mobile  · Call the toll free National Suicide Prevention Hotlines   · National Suicide Prevention Lifeline 312-932-WZSO (4943)  · National Health-Connected Line Network 800-SUICIDE (007-0994)

## 2019-12-04 ENCOUNTER — OFFICE VISIT (OUTPATIENT)
Dept: URGENT CARE | Facility: PHYSICIAN GROUP | Age: 39
End: 2019-12-04
Payer: COMMERCIAL

## 2019-12-04 VITALS
HEIGHT: 62 IN | HEART RATE: 74 BPM | BODY MASS INDEX: 31.28 KG/M2 | RESPIRATION RATE: 16 BRPM | TEMPERATURE: 98.6 F | SYSTOLIC BLOOD PRESSURE: 118 MMHG | OXYGEN SATURATION: 98 % | WEIGHT: 170 LBS | DIASTOLIC BLOOD PRESSURE: 70 MMHG

## 2019-12-04 DIAGNOSIS — J02.9 SORE THROAT: ICD-10-CM

## 2019-12-04 DIAGNOSIS — J02.0 ACUTE STREPTOCOCCAL PHARYNGITIS: ICD-10-CM

## 2019-12-04 LAB
INT CON NEG: NEGATIVE
INT CON POS: POSITIVE
S PYO AG THROAT QL: NORMAL

## 2019-12-04 PROCEDURE — 87880 STREP A ASSAY W/OPTIC: CPT | Performed by: NURSE PRACTITIONER

## 2019-12-04 PROCEDURE — 99203 OFFICE O/P NEW LOW 30 MIN: CPT | Performed by: NURSE PRACTITIONER

## 2019-12-04 RX ORDER — CEPHALEXIN 500 MG/1
500 CAPSULE ORAL 2 TIMES DAILY
Qty: 20 CAP | Refills: 0 | Status: SHIPPED | OUTPATIENT
Start: 2019-12-04 | End: 2019-12-14

## 2019-12-04 ASSESSMENT — ENCOUNTER SYMPTOMS
MYALGIAS: 1
NAUSEA: 0
DIARRHEA: 0
HEADACHES: 0
STRIDOR: 0
TROUBLE SWALLOWING: 1
FEVER: 0
COUGH: 0
PALPITATIONS: 0
CONSTIPATION: 0
WHEEZING: 0
BLURRED VISION: 0
CHILLS: 1
SORE THROAT: 1
VOMITING: 0
DOUBLE VISION: 0
ABDOMINAL PAIN: 0
DIZZINESS: 0
SWOLLEN GLANDS: 1

## 2019-12-04 NOTE — PROGRESS NOTES
"Subjective:   Stefania Garrison is a 38 y.o. female who presents for Sore Throat (hurts to swallow, swollen, X monday )        Pharyngitis    This is a new problem. The current episode started in the past 7 days. The problem has been unchanged. The pain is worse on the left side. There has been no fever. The pain is moderate. Associated symptoms include swollen glands and trouble swallowing. Pertinent negatives include no abdominal pain, congestion, coughing, diarrhea, ear discharge, ear pain, headaches, plugged ear sensation, stridor or vomiting. She has tried NSAIDs for the symptoms. The treatment provided mild relief.        Review of Systems   Constitutional: Positive for chills. Negative for fever.   HENT: Positive for sore throat and trouble swallowing. Negative for congestion, ear discharge and ear pain.    Eyes: Negative for blurred vision and double vision.   Respiratory: Negative for cough, wheezing and stridor.    Cardiovascular: Negative for chest pain and palpitations.   Gastrointestinal: Negative for abdominal pain, constipation, diarrhea, nausea and vomiting.   Musculoskeletal: Positive for myalgias.   Skin: Negative.  Negative for itching and rash.   Neurological: Negative for dizziness and headaches.   All other systems reviewed and are negative.    Patient's PMH, SocHx, SurgHx, FamHx, Drug allergies and medications reviewed.     Objective:   /70   Pulse 74   Temp 37 °C (98.6 °F)   Resp 16   Ht 1.575 m (5' 2\")   Wt 77.1 kg (170 lb)   SpO2 98%   BMI 31.09 kg/m²   Physical Exam  Vitals signs reviewed.   Constitutional:       Appearance: She is well-developed.   HENT:      Head: Normocephalic.      Right Ear: Tympanic membrane and ear canal normal. No middle ear effusion. Tympanic membrane is not perforated or erythematous.      Left Ear: Tympanic membrane and ear canal normal.  No middle ear effusion. Tympanic membrane is not perforated or erythematous.      Nose: Nose normal. No " rhinorrhea.      Right Sinus: No maxillary sinus tenderness or frontal sinus tenderness.      Left Sinus: No maxillary sinus tenderness or frontal sinus tenderness.      Mouth/Throat:      Lips: Pink.      Mouth: Mucous membranes are moist.      Pharynx: Oropharynx is clear. Uvula midline. Posterior oropharyngeal erythema present. No oropharyngeal exudate or uvula swelling.      Tonsils: No tonsillar exudate or tonsillar abscesses. Swellin+ on the right. 2+ on the left.   Eyes:      General: Lids are normal.      Extraocular Movements: Extraocular movements intact.      Conjunctiva/sclera: Conjunctivae normal.      Pupils: Pupils are equal, round, and reactive to light.   Neck:      Musculoskeletal: Normal range of motion.      Thyroid: No thyromegaly.   Cardiovascular:      Rate and Rhythm: Normal rate and regular rhythm.      Heart sounds: Normal heart sounds.   Pulmonary:      Effort: Pulmonary effort is normal. No respiratory distress.      Breath sounds: Normal breath sounds. No wheezing.   Lymphadenopathy:      Head:      Right side of head: Tonsillar adenopathy present. No submandibular adenopathy.      Left side of head: Tonsillar adenopathy present. No submandibular adenopathy.   Skin:     General: Skin is warm and dry.      Findings: No rash.   Neurological:      Mental Status: She is alert and oriented to person, place, and time.   Psychiatric:         Mood and Affect: Mood normal.         Speech: Speech normal.         Behavior: Behavior normal. Behavior is cooperative.         Thought Content: Thought content normal.         Judgment: Judgment normal.           Assessment/Plan:   Assessment    1. Sore throat  - POCT Rapid Strep A    2. Acute streptococcal pharyngitis  - cephALEXin (KEFLEX) 500 MG Cap; Take 1 Cap by mouth 2 times a day for 10 days.  Dispense: 20 Cap; Refill: 0    Rapid strep positive. Change toothbrush in 2 days.    Differential diagnosis, natural history, supportive care, and  indications for immediate follow-up discussed.     **Please note that all invasive procedures during this visit were performed by myself and/or the Medical Assistant under the supervision of the PA or MD in office**

## 2019-12-04 NOTE — LETTER
December 4, 2019         Patient: Stefania Garrison   YOB: 1980   Date of Visit: 12/4/2019           To Whom it May Concern:    Stefania Garrison was seen in my clinic on 12/4/2019. Please excuse her from work 12/4/2019 through 12/5/2019. She may return on 12/6/2019.    If you have any questions or concerns, please don't hesitate to call.        Sincerely,           DEMAR Tejada.  Electronically Signed      September 10, 2018      Ugo Pratt MD  [address] [place] [address]           [facility]  [address]  [place] [address]  Phone: [phone]  Fax: [phone]          Patient: Wilmer Aponte   MR Number: [id number]   YOB: 2000   Date of Visit: 9/10/2018       Dear Dr. Ugo Pratt:    Thank you for referring Wilmer Aponte to me for evaluation. Attached you will find relevant portions of my assessment and plan of care.    If you have questions, please do not hesitate to call me. I look forward to following Wilmer Aponte along with you.    Sincerely,    oMrteza Renae MD    Enclosure  CC:  No Recipients    If you would like to receive this communication electronically, please contact [email or web address] or [phone] to request more information on [employer] access.    For providers and/or their staff who would like to refer a patient to [facility], please contact us through our one-stop-shop provider referral line, [facility], at [phone].    If you feel you have received this communication in error or would no longer like to receive these types of communications, please e-mail [email or web address]

## 2020-03-18 ENCOUNTER — OFFICE VISIT (OUTPATIENT)
Dept: URGENT CARE | Facility: CLINIC | Age: 40
End: 2020-03-18
Payer: COMMERCIAL

## 2020-03-18 VITALS
DIASTOLIC BLOOD PRESSURE: 74 MMHG | TEMPERATURE: 98.2 F | HEART RATE: 100 BPM | HEIGHT: 62 IN | WEIGHT: 170 LBS | SYSTOLIC BLOOD PRESSURE: 116 MMHG | OXYGEN SATURATION: 96 % | BODY MASS INDEX: 31.28 KG/M2 | RESPIRATION RATE: 20 BRPM

## 2020-03-18 DIAGNOSIS — R05.9 COUGH: ICD-10-CM

## 2020-03-18 DIAGNOSIS — R06.02 SHORTNESS OF BREATH: ICD-10-CM

## 2020-03-18 DIAGNOSIS — R68.89 FLU-LIKE SYMPTOMS: ICD-10-CM

## 2020-03-18 DIAGNOSIS — J06.9 UPPER RESPIRATORY TRACT INFECTION, UNSPECIFIED TYPE: ICD-10-CM

## 2020-03-18 LAB
FLUAV+FLUBV AG SPEC QL IA: NORMAL
INT CON NEG: NEGATIVE
INT CON POS: POSITIVE

## 2020-03-18 PROCEDURE — 87804 INFLUENZA ASSAY W/OPTIC: CPT | Performed by: NURSE PRACTITIONER

## 2020-03-18 PROCEDURE — 99214 OFFICE O/P EST MOD 30 MIN: CPT | Mod: 25 | Performed by: NURSE PRACTITIONER

## 2020-03-18 PROCEDURE — 94640 AIRWAY INHALATION TREATMENT: CPT | Performed by: NURSE PRACTITIONER

## 2020-03-18 RX ORDER — DEXTROMETHORPHAN HYDROBROMIDE AND PROMETHAZINE HYDROCHLORIDE 15; 6.25 MG/5ML; MG/5ML
5 SYRUP ORAL EVERY 4 HOURS PRN
Qty: 100 ML | Refills: 0 | Status: SHIPPED | OUTPATIENT
Start: 2020-03-18 | End: 2020-03-25

## 2020-03-18 RX ORDER — IPRATROPIUM BROMIDE AND ALBUTEROL SULFATE 2.5; .5 MG/3ML; MG/3ML
3 SOLUTION RESPIRATORY (INHALATION) ONCE
Status: COMPLETED | OUTPATIENT
Start: 2020-03-18 | End: 2020-03-18

## 2020-03-18 RX ORDER — IPRATROPIUM BROMIDE AND ALBUTEROL SULFATE 2.5; .5 MG/3ML; MG/3ML
3 SOLUTION RESPIRATORY (INHALATION) 4 TIMES DAILY
Qty: 30 BULLET | Refills: 0 | Status: SHIPPED | OUTPATIENT
Start: 2020-03-18 | End: 2020-06-08

## 2020-03-18 RX ORDER — PREDNISONE 20 MG/1
40 TABLET ORAL DAILY
Qty: 14 TAB | Refills: 0 | Status: SHIPPED | OUTPATIENT
Start: 2020-03-18 | End: 2020-03-25

## 2020-03-18 RX ADMIN — IPRATROPIUM BROMIDE AND ALBUTEROL SULFATE 3 ML: 2.5; .5 SOLUTION RESPIRATORY (INHALATION) at 21:17

## 2020-03-18 ASSESSMENT — ENCOUNTER SYMPTOMS
BACK PAIN: 1
SORE THROAT: 1
CHILLS: 0
ABDOMINAL PAIN: 0
SHORTNESS OF BREATH: 1
HEARTBURN: 0
COUGH: 1
HEADACHES: 1
NAUSEA: 0
VOMITING: 0
WHEEZING: 1
MYALGIAS: 1
DOUBLE VISION: 0
SPUTUM PRODUCTION: 1
BLURRED VISION: 0
PALPITATIONS: 0
HEMOPTYSIS: 0
FEVER: 1

## 2020-03-18 ASSESSMENT — FIBROSIS 4 INDEX: FIB4 SCORE: 0.53

## 2020-03-19 NOTE — PATIENT INSTRUCTIONS
"Upper Respiratory Infection, Adult  Most upper respiratory infections (URIs) are a viral infection of the air passages leading to the lungs. A URI affects the nose, throat, and upper air passages. The most common type of URI is nasopharyngitis and is typically referred to as \"the common cold.\"  URIs run their course and usually go away on their own. Most of the time, a URI does not require medical attention, but sometimes a bacterial infection in the upper airways can follow a viral infection. This is called a secondary infection. Sinus and middle ear infections are common types of secondary upper respiratory infections.  Bacterial pneumonia can also complicate a URI. A URI can worsen asthma and chronic obstructive pulmonary disease (COPD). Sometimes, these complications can require emergency medical care and may be life threatening.  What are the causes?  Almost all URIs are caused by viruses. A virus is a type of germ and can spread from one person to another.  What increases the risk?  You may be at risk for a URI if:  · You smoke.  · You have chronic heart or lung disease.  · You have a weakened defense (immune) system.  · You are very young or very old.  · You have nasal allergies or asthma.  · You work in crowded or poorly ventilated areas.  · You work in health care facilities or schools.  What are the signs or symptoms?  Symptoms typically develop 2-3 days after you come in contact with a cold virus. Most viral URIs last 7-10 days. However, viral URIs from the influenza virus (flu virus) can last 14-18 days and are typically more severe. Symptoms may include:  · Runny or stuffy (congested) nose.  · Sneezing.  · Cough.  · Sore throat.  · Headache.  · Fatigue.  · Fever.  · Loss of appetite.  · Pain in your forehead, behind your eyes, and over your cheekbones (sinus pain).  · Muscle aches.  How is this diagnosed?  Your health care provider may diagnose a URI by:  · Physical exam.  · Tests to check that your " symptoms are not due to another condition such as:  ¨ Strep throat.  ¨ Sinusitis.  ¨ Pneumonia.  ¨ Asthma.  How is this treated?  A URI goes away on its own with time. It cannot be cured with medicines, but medicines may be prescribed or recommended to relieve symptoms. Medicines may help:  · Reduce your fever.  · Reduce your cough.  · Relieve nasal congestion.  Follow these instructions at home:  · Take medicines only as directed by your health care provider.  · Gargle warm saltwater or take cough drops to comfort your throat as directed by your health care provider.  · Use a warm mist humidifier or inhale steam from a shower to increase air moisture. This may make it easier to breathe.  · Drink enough fluid to keep your urine clear or pale yellow.  · Eat soups and other clear broths and maintain good nutrition.  · Rest as needed.  · Return to work when your temperature has returned to normal or as your health care provider advises. You may need to stay home longer to avoid infecting others. You can also use a face mask and careful hand washing to prevent spread of the virus.  · Increase the usage of your inhaler if you have asthma.  · Do not use any tobacco products, including cigarettes, chewing tobacco, or electronic cigarettes. If you need help quitting, ask your health care provider.  How is this prevented?  The best way to protect yourself from getting a cold is to practice good hygiene.  · Avoid oral or hand contact with people with cold symptoms.  · Wash your hands often if contact occurs.  There is no clear evidence that vitamin C, vitamin E, echinacea, or exercise reduces the chance of developing a cold. However, it is always recommended to get plenty of rest, exercise, and practice good nutrition.  Contact a health care provider if:  · You are getting worse rather than better.  · Your symptoms are not controlled by medicine.  · You have chills.  · You have worsening shortness of breath.  · You have brown  or red mucus.  · You have yellow or brown nasal discharge.  · You have pain in your face, especially when you bend forward.  · You have a fever.  · You have swollen neck glands.  · You have pain while swallowing.  · You have white areas in the back of your throat.  Get help right away if:  · You have severe or persistent:  ¨ Headache.  ¨ Ear pain.  ¨ Sinus pain.  ¨ Chest pain.  · You have chronic lung disease and any of the following:  ¨ Wheezing.  ¨ Prolonged cough.  ¨ Coughing up blood.  ¨ A change in your usual mucus.  · You have a stiff neck.  · You have changes in your:  ¨ Vision.  ¨ Hearing.  ¨ Thinking.  ¨ Mood.  This information is not intended to replace advice given to you by your health care provider. Make sure you discuss any questions you have with your health care provider.  Document Released: 06/13/2002 Document Revised: 08/20/2017 Document Reviewed: 03/25/2015  [employer] Interactive Patient Education © 2017 [employer]  Self-Isolating at Home  If it is determined that you do not need to be hospitalized and can be isolated at home please follow the follow the prevention steps below as based on CDC guidelines.  Stay home except to get medical care  People who are mildly ill with unconfirmed COVID-19 or have any other infectious respiratory illness are able to isolate at home during their illness. You should restrict activities outside your home, except for getting medical care. Do not go to work, school, or public areas. Avoid using public transportation, ride-sharing, or taxis.  Call ahead before visiting your doctor  If you have a medical appointment, call the healthcare provider and tell them that you have or may have unconfirmed COVID-19 or another possibly contagious respiratory illness. This will help the healthcare provider’s office take steps to keep other people from getting infected or exposed.  Separate yourself from other people and animals in your home  As much as possible, you should  stay in a specific room and away from other people in your home. Also, you should use a separate bathroom, if available.  You should restrict contact with pets and other animals while you are sick, just like you would around other people. When possible, have another member of your household care for your animals while you are sick. If you must care for your pet or be around animals while you are sick, wash your hands before and after you interact with pets.  Wear a facemask  You should wear a facemask when you are around other people (e.g., sharing a room or vehicle) or pets and before you enter a healthcare provider’s office. If you are not able to wear a facemask (for example, because it causes trouble breathing), then people who live with you should not stay in the same room with you, or they should wear a facemask if they enter your room.  Cover your coughs and sneezes  Cover your mouth and nose with a tissue when you cough or sneeze. Throw used tissues in a lined trash can. Immediately wash your hands with soap and water for at least 20 seconds or, if soap and water are not available, clean your hands with an alcohol-based hand  that contains at least 60% alcohol.  Clean your hands often  Wash your hands often with soap and water for at least 20 seconds, especially after blowing your nose, coughing, or sneezing; going to the bathroom; and before eating or preparing food. If soap and water are not readily available, use an alcohol-based hand  with at least 60% alcohol, covering all surfaces of your hands and rubbing them together until they feel dry.  Soap and water are the best option if hands are visibly dirty. Avoid touching your eyes, nose, and mouth with unwashed hands.  Avoid sharing personal household items  You should not share dishes, drinking glasses, cups, eating utensils, towels, or bedding with other people or pets in your home. After using these items, they should be washed  thoroughly with soap and water.  Clean all “high-touch” surfaces everyday  High touch surfaces include counters, tabletops, doorknobs, bathroom fixtures, toilets, phones, keyboards, tablets, and bedside tables. Also, clean any surfaces that may have blood, stool, or body fluids on them. Use a household cleaning spray or wipe, according to the label instructions. Labels contain instructions for safe and effective use of the cleaning product including precautions you should take when applying the product, such as wearing gloves and making sure you have good ventilation during use of the product.  Monitor your symptoms  Seek prompt medical attention if your illness is worsening (e.g., difficulty breathing). Before seeking care, call your healthcare provider and tell them that you have, or are being evaluated for, unconfirmed COVID-19 or another infectious respiratory illness. Put on a facemask before you enter the facility. These steps will help the healthcare provider’s office to keep other people in the office or waiting room from getting infected or exposed. Ask your healthcare provider to call the local or [facility] health department. Persons who are placed under active monitoring or facilitated self-monitoring should follow instructions provided by their local health department or occupational health professionals, as appropriate. When working with your local health department check their available hours.  If you have a medical emergency and need to call 911, notify the dispatch personnel that you have, or are being evaluated for unconfirmed COVID-19 or another infectious respiratory illness. If possible, put on a facemask before emergency medical services arrive.  Discontinuing home isolation  Patients with unconfirmed COVID-19 or other infectious respiratory illnesses should remain under home isolation precautions until the risk of secondary transmission to others is thought to be low. In general that means 72 hours  after fever resolves without the use of fever reducing medications, AND symptoms have improved AND at least 7 days since symptoms first appeared. If you have questions or concerns consult your healthcare providers or your local health department.

## 2020-03-19 NOTE — PROGRESS NOTES
Subjective:     Stefania Garrison is a 39 y.o. female who presents for Cough (cough, fever, SOB x's 2 days)      Cough   This is a new problem. The current episode started in the past 7 days (2 days ago). The problem has been gradually worsening. The cough is non-productive. Associated symptoms include chest pain, a fever, headaches, myalgias, nasal congestion, a sore throat, shortness of breath and wheezing. Pertinent negatives include no chills, ear pain, heartburn, hemoptysis or rash. The symptoms are aggravated by lying down and exercise (talking). Treatments tried: nyquil, dayquil. The treatment provided mild relief. There is no history of asthma, bronchitis or pneumonia.     Review of Systems   Constitutional: Positive for fever and malaise/fatigue. Negative for chills.   HENT: Positive for congestion and sore throat. Negative for ear discharge and ear pain.    Eyes: Negative for blurred vision and double vision.   Respiratory: Positive for cough, sputum production, shortness of breath and wheezing. Negative for hemoptysis.    Cardiovascular: Positive for chest pain. Negative for palpitations.   Gastrointestinal: Negative for abdominal pain, heartburn, nausea and vomiting.   Genitourinary: Negative for dysuria, frequency and urgency.   Musculoskeletal: Positive for back pain and myalgias.   Skin: Negative for itching and rash.   Neurological: Positive for headaches.   All other systems reviewed and are negative.      PMH: No past medical history on file.  ALLERGIES:   Allergies   Allergen Reactions   • Pcn [Penicillins] Anaphylaxis     SURGHX:   Past Surgical History:   Procedure Laterality Date   • CHOLECYSTECTOMY  2018     SOCHX:   Social History     Socioeconomic History   • Marital status:      Spouse name: Not on file   • Number of children: Not on file   • Years of education: Not on file   • Highest education level: Not on file   Occupational History   • Not on file   Social Needs   • Financial  "resource strain: Not on file   • Food insecurity     Worry: Not on file     Inability: Not on file   • Transportation needs     Medical: Not on file     Non-medical: Not on file   Tobacco Use   • Smoking status: Current Every Day Smoker     Packs/day: 1.50     Years: 20.00     Pack years: 30.00     Types: Cigarettes   • Smokeless tobacco: Never Used   • Tobacco comment: 1-2 ppd   Substance and Sexual Activity   • Alcohol use: Yes     Frequency: 2-4 times a month     Drinks per session: 3 or 4     Binge frequency: Never   • Drug use: Not Currently     Types: Marijuana, Inhaled   • Sexual activity: Not on file   Lifestyle   • Physical activity     Days per week: Not on file     Minutes per session: Not on file   • Stress: Not on file   Relationships   • Social connections     Talks on phone: Not on file     Gets together: Not on file     Attends [employer] service: Not on file     Active member of club or organization: Not on file     Attends meetings of clubs or organizations: Not on file     Relationship status: Not on file   • Intimate partner violence     Fear of current or ex partner: Not on file     Emotionally abused: Not on file     Physically abused: Not on file     Forced sexual activity: Not on file   Other Topics Concern   • Not on file   Social History Narrative   • Not on file     FH: No family history on file.      Objective:   /74 (BP Location: Left arm, Patient Position: Sitting, BP Cuff Size: Adult)   Pulse 100   Temp 36.8 °C (98.2 °F) (Temporal)   Resp 20   Ht 1.575 m (5' 2\")   Wt 77.1 kg (170 lb)   SpO2 96%   BMI 31.09 kg/m²     Physical Exam  Vitals signs and nursing note reviewed.   Constitutional:       Appearance: She is ill-appearing.   HENT:      Head: Normocephalic and atraumatic.      Right Ear: Tympanic membrane normal.      Left Ear: Tympanic membrane normal.      Nose: Congestion and rhinorrhea present.      Mouth/Throat:      Mouth: Mucous membranes are moist.      Pharynx: " Posterior oropharyngeal erythema present. No oropharyngeal exudate.   Eyes:      General:         Right eye: No discharge.         Left eye: No discharge.      Extraocular Movements: Extraocular movements intact.      Conjunctiva/sclera: Conjunctivae normal.      Pupils: Pupils are equal, round, and reactive to light.   Neck:      Musculoskeletal: Normal range of motion. Muscular tenderness present.   Cardiovascular:      Rate and Rhythm: Tachycardia present.      Heart sounds: Normal heart sounds. No murmur.   Pulmonary:      Effort: Pulmonary effort is normal.      Breath sounds: Examination of the right-upper field reveals wheezing and rhonchi. Examination of the left-upper field reveals wheezing and rhonchi. Examination of the right-middle field reveals wheezing and rhonchi. Examination of the left-middle field reveals wheezing and rhonchi. Examination of the right-lower field reveals wheezing and rhonchi. Examination of the left-lower field reveals wheezing and rhonchi. Wheezing and rhonchi present.      Comments: Duoneb treatment given in office, O2 increased to 100%. Lungs improved following treatment.   Abdominal:      General: Abdomen is flat. There is no distension.      Palpations: Abdomen is soft.   Musculoskeletal: Normal range of motion.   Lymphadenopathy:      Cervical: Cervical adenopathy present.   Skin:     General: Skin is warm and dry.      Capillary Refill: Capillary refill takes less than 2 seconds.   Neurological:      General: No focal deficit present.      Mental Status: She is alert and oriented to person, place, and time. Mental status is at baseline.   Psychiatric:         Mood and Affect: Mood normal.         Behavior: Behavior normal.         Thought Content: Thought content normal.         Judgment: Judgment normal.       POCT flu: neg  Assessment/Plan:   Assessment    1. Flu-like symptoms  POCT Influenza A/B   2. Shortness of breath  ipratropium-albuterol (DUONEB) nebulizer solution     ipratropium-albuterol (DUONEB) 0.5-2.5 (3) MG/3ML nebulizer solution    predniSONE (DELTASONE) 20 MG Tab   3. Cough  promethazine-dextromethorphan (PROMETHAZINE-DM) 6.25-15 MG/5ML syrup    predniSONE (DELTASONE) 20 MG Tab   4. Upper respiratory tract infection, unspecified type  predniSONE (DELTASONE) 20 MG Tab     We discussed supportive measures including humidifier, warm salt water gargles, over-the-counter Cepacol throat lozenges, rest  and increased fluids.  She was encouraged to seek treatment back in the ER or urgent care for worsening symptoms,  fever greater than 100.5, wheezes or shortness of breath.  AVS handout given and reviewed with patient. Pt educated on red flags and when to seek treatment back in ER or UC.   She was informed to remain in quarantine per [facility] guidelines for the next two weeks. She is in agreement with this plan.

## 2020-06-06 ENCOUNTER — HOSPITAL ENCOUNTER (EMERGENCY)
Facility: MEDICAL CENTER | Age: 40
End: 2020-06-06
Attending: EMERGENCY MEDICINE
Payer: COMMERCIAL

## 2020-06-06 VITALS
HEIGHT: 62 IN | BODY MASS INDEX: 34.96 KG/M2 | HEART RATE: 91 BPM | OXYGEN SATURATION: 99 % | WEIGHT: 190 LBS | RESPIRATION RATE: 18 BRPM | SYSTOLIC BLOOD PRESSURE: 116 MMHG | DIASTOLIC BLOOD PRESSURE: 65 MMHG | TEMPERATURE: 98 F

## 2020-06-06 DIAGNOSIS — R20.0 NUMBNESS: ICD-10-CM

## 2020-06-06 DIAGNOSIS — R00.2 PALPITATIONS: ICD-10-CM

## 2020-06-06 LAB
ALBUMIN SERPL BCP-MCNC: 4.4 G/DL (ref 3.2–4.9)
ALBUMIN/GLOB SERPL: 1.3 G/DL
ALP SERPL-CCNC: 79 U/L (ref 30–99)
ALT SERPL-CCNC: 24 U/L (ref 2–50)
ANION GAP SERPL CALC-SCNC: 15 MMOL/L (ref 7–16)
AST SERPL-CCNC: 21 U/L (ref 12–45)
BASOPHILS # BLD AUTO: 2.6 % (ref 0–1.8)
BASOPHILS # BLD: 0.29 K/UL (ref 0–0.12)
BILIRUB SERPL-MCNC: 0.4 MG/DL (ref 0.1–1.5)
BUN SERPL-MCNC: 10 MG/DL (ref 8–22)
CALCIUM SERPL-MCNC: 9.5 MG/DL (ref 8.5–10.5)
CHLORIDE SERPL-SCNC: 101 MMOL/L (ref 96–112)
CO2 SERPL-SCNC: 19 MMOL/L (ref 20–33)
CREAT SERPL-MCNC: 0.65 MG/DL (ref 0.5–1.4)
EOSINOPHIL # BLD AUTO: 1.08 K/UL (ref 0–0.51)
EOSINOPHIL NFR BLD: 9.6 % (ref 0–6.9)
ERYTHROCYTE [DISTWIDTH] IN BLOOD BY AUTOMATED COUNT: 42.8 FL (ref 35.9–50)
GLOBULIN SER CALC-MCNC: 3.3 G/DL (ref 1.9–3.5)
GLUCOSE SERPL-MCNC: 107 MG/DL (ref 65–99)
HCT VFR BLD AUTO: 41.9 % (ref 37–47)
HGB BLD-MCNC: 14.8 G/DL (ref 12–16)
LYMPHOCYTES # BLD AUTO: 3.07 K/UL (ref 1–4.8)
LYMPHOCYTES NFR BLD: 27.2 % (ref 22–41)
MANUAL DIFF BLD: NORMAL
MCH RBC QN AUTO: 32.5 PG (ref 27–33)
MCHC RBC AUTO-ENTMCNC: 35.3 G/DL (ref 33.6–35)
MCV RBC AUTO: 91.9 FL (ref 81.4–97.8)
MONOCYTES # BLD AUTO: 0.2 K/UL (ref 0–0.85)
MONOCYTES NFR BLD AUTO: 1.8 % (ref 0–13.4)
MORPHOLOGY BLD-IMP: NORMAL
NEUTROPHILS # BLD AUTO: 6.64 K/UL (ref 2–7.15)
NEUTROPHILS NFR BLD: 58.8 % (ref 44–72)
NRBC # BLD AUTO: 0 K/UL
NRBC BLD-RTO: 0 /100 WBC
PLATELET # BLD AUTO: 391 K/UL (ref 164–446)
PLATELET BLD QL SMEAR: NORMAL
PMV BLD AUTO: 9.4 FL (ref 9–12.9)
POTASSIUM SERPL-SCNC: 3.6 MMOL/L (ref 3.6–5.5)
PROT SERPL-MCNC: 7.7 G/DL (ref 6–8.2)
RBC # BLD AUTO: 4.56 M/UL (ref 4.2–5.4)
RBC BLD AUTO: NORMAL
SODIUM SERPL-SCNC: 135 MMOL/L (ref 135–145)
TSH SERPL DL<=0.005 MIU/L-ACNC: 1.39 UIU/ML (ref 0.38–5.33)
WBC # BLD AUTO: 11.3 K/UL (ref 4.8–10.8)

## 2020-06-06 PROCEDURE — 85007 BL SMEAR W/DIFF WBC COUNT: CPT

## 2020-06-06 PROCEDURE — 93005 ELECTROCARDIOGRAM TRACING: CPT | Performed by: EMERGENCY MEDICINE

## 2020-06-06 PROCEDURE — 80053 COMPREHEN METABOLIC PANEL: CPT

## 2020-06-06 PROCEDURE — 96374 THER/PROPH/DIAG INJ IV PUSH: CPT

## 2020-06-06 PROCEDURE — 36415 COLL VENOUS BLD VENIPUNCTURE: CPT

## 2020-06-06 PROCEDURE — 85027 COMPLETE CBC AUTOMATED: CPT

## 2020-06-06 PROCEDURE — 99284 EMERGENCY DEPT VISIT MOD MDM: CPT

## 2020-06-06 PROCEDURE — 700111 HCHG RX REV CODE 636 W/ 250 OVERRIDE (IP): Performed by: EMERGENCY MEDICINE

## 2020-06-06 PROCEDURE — 84443 ASSAY THYROID STIM HORMONE: CPT

## 2020-06-06 RX ORDER — LORAZEPAM 2 MG/ML
1 INJECTION INTRAMUSCULAR ONCE
Status: COMPLETED | OUTPATIENT
Start: 2020-06-06 | End: 2020-06-06

## 2020-06-06 RX ADMIN — LORAZEPAM 1 MG: 2 INJECTION INTRAMUSCULAR; INTRAVENOUS at 14:09

## 2020-06-06 ASSESSMENT — FIBROSIS 4 INDEX: FIB4 SCORE: 0.53

## 2020-06-06 NOTE — ED PROVIDER NOTES
ED Provider Note    CHIEF COMPLAINT  Chief Complaint   Patient presents with   • Numbness       HPI  Echo Brittni Garrison is a 39 y.o. female who presents to the emergency department complaining that about 45 minutes ago while at rest she suddenly developed numbness and tingling of both hands and both feet as well as her lips bilaterally and her tongue the patient also noted that she had a very rapid heart rate during this time.  Last August she was hospitalized after similar episode but she says at that time all her symptoms were on one side of her body.  She was thoroughly evaluated including neurology consultation and MRI of her brain which was normal she also had an echocardiogram which showed a possible PFO according to the reports.  The patient states that she has been trying to establish a primary care doctor but has not yet been successful and she is on a [facility] waiting list.    REVIEW OF SYSTEMS no recent chest pain shortness of breath hemoptysis fever or chills no known COVID exposure.  None of the patient's symptoms today were unilateral.  All other systems negative    PAST MEDICAL HISTORY  No past medical history on file.    FAMILY HISTORY  No family history on file.    SOCIAL HISTORY  Social History     Socioeconomic History   • Marital status:      Spouse name: Not on file   • Number of children: Not on file   • Years of education: Not on file   • Highest education level: Not on file   Occupational History   • Not on file   Social Needs   • Financial resource strain: Not on file   • Food insecurity     Worry: Not on file     Inability: Not on file   • Transportation needs     Medical: Not on file     Non-medical: Not on file   Tobacco Use   • Smoking status: Current Every Day Smoker     Packs/day: 1.50     Years: 20.00     Pack years: 30.00     Types: Cigarettes   • Smokeless tobacco: Never Used   • Tobacco comment: 1-2 ppd   Substance and Sexual Activity   • Alcohol use: Yes     Frequency: 2-4  "times a month     Drinks per session: 3 or 4     Binge frequency: Never   • Drug use: Not Currently     Types: Marijuana, Inhaled   • Sexual activity: Not on file   Lifestyle   • Physical activity     Days per week: Not on file     Minutes per session: Not on file   • Stress: Not on file   Relationships   • Social connections     Talks on phone: Not on file     Gets together: Not on file     Attends Synagogue service: Not on file     Active member of club or organization: Not on file     Attends meetings of clubs or organizations: Not on file     Relationship status: Not on file   • Intimate partner violence     Fear of current or ex partner: Not on file     Emotionally abused: Not on file     Physically abused: Not on file     Forced sexual activity: Not on file   Other Topics Concern   • Not on file   Social History Narrative   • Not on file       SURGICAL HISTORY  Past Surgical History:   Procedure Laterality Date   • CHOLECYSTECTOMY  2018       CURRENT MEDICATIONS  Home Medications    **Home medications have not yet been reviewed for this encounter**         ALLERGIES  Allergies   Allergen Reactions   • Pcn [Penicillins] Anaphylaxis       PHYSICAL EXAM  VITAL SIGNS: /65   Pulse 91   Temp 36.7 °C (98 °F) (Temporal)   Resp 18   Ht 1.575 m (5' 2\")   Wt 86.2 kg (190 lb)   SpO2 99%   BMI 34.75 kg/m²    Oxygen saturation is interpreted as adequate  Constitutional: Awake verbal pleasant individual somewhat anxious but in no acute distress  HENT: Mucous membranes are moist  Eyes: No erythema discharge present  Neck: Trachea midline no JVD  Cardiovascular: Regular rate and rhythm  Lungs: Air and equal bilaterally with no apparent difficulty breathing  Skin: Warm and dry  Musculoskeletal: No leg edema or calf tenderness  Neurologic: Awake verbal moving all extremities    Laboratory  CBC shows white blood cell count of 11.3 hemoglobin is adequate at 14.8 in the differential the eosinophils and basophils are " slightly elevated.  Basic metabolic panel shows a slightly low bicarb of 19 LFTs are unremarkable TSH is within the normal range at 1.390    EKG interpretation  Twelve-lead EKG shows sinus rhythm 84 bpm there is no pathologic ST elevation or depression J-point elevation in V2 IA interval 156 QTc interval 436    MEDICAL DECISION MAKING and DISPOSITION  In the emergency department an IV was established and the patient was placed on a cardiac monitor the patient was given 1 mg of intravenous Ativan for anxiety and this was very helpful.  I have reviewed all the findings with the patient and I think most likely the symptoms today are secondary to anxiety disorder, I do not think that this is a stroke or CNS problem given that it was bilateral with no unilateral features at all.  I have also discussed the incidental finding of increased eosinophils and basophils with the patient and this will need follow-up with her primary care doctor.  I have contacted the hospital  and the patient is scheduled for a primary care appointment tomorrow at 1 PM for recheck and further care.  If the patient feels she is having new or worsening symptoms she is to return here    IMPRESSION  1.  Episode of numbness and tachycardia  2.  Anxiety disorder         Electronically signed by: Sander Sigala M.D., 6/6/2020 4:58 PM

## 2020-06-06 NOTE — ED TRIAGE NOTES
Name and  verified for triage    Pt here via medic from home with c/o numbness/tingling to bilateral hands and feet x 2 hours. States hands/feet have both improved but now she has tingling to tongue and lips. Hx TIA and has had extensive neuro workups for same symptoms     Denies HA

## 2020-06-06 NOTE — DISCHARGE INSTRUCTIONS
Follow up at 1pm at [facility]. Return here if you have new or worsening symptoms

## 2020-06-08 ENCOUNTER — OFFICE VISIT (OUTPATIENT)
Dept: MEDICAL GROUP | Facility: LAB | Age: 40
End: 2020-06-08
Payer: COMMERCIAL

## 2020-06-08 VITALS
SYSTOLIC BLOOD PRESSURE: 118 MMHG | HEIGHT: 63 IN | OXYGEN SATURATION: 99 % | RESPIRATION RATE: 15 BRPM | TEMPERATURE: 98.6 F | HEART RATE: 90 BPM | DIASTOLIC BLOOD PRESSURE: 72 MMHG | WEIGHT: 175 LBS | BODY MASS INDEX: 31.01 KG/M2

## 2020-06-08 DIAGNOSIS — R00.2 PALPITATIONS: ICD-10-CM

## 2020-06-08 DIAGNOSIS — Z76.89 ENCOUNTER TO ESTABLISH CARE: ICD-10-CM

## 2020-06-08 DIAGNOSIS — Z72.0 TOBACCO ABUSE: ICD-10-CM

## 2020-06-08 PROCEDURE — 99214 OFFICE O/P EST MOD 30 MIN: CPT | Performed by: FAMILY MEDICINE

## 2020-06-08 RX ORDER — VARENICLINE TARTRATE 1 MG/1
1 TABLET, FILM COATED ORAL 2 TIMES DAILY
Qty: 60 TAB | Refills: 3 | Status: SHIPPED | OUTPATIENT
Start: 2020-06-08 | End: 2020-07-13 | Stop reason: SDUPTHER

## 2020-06-08 RX ORDER — PREDNISONE 20 MG/1
TABLET ORAL
COMMUNITY
Start: 2020-03-18 | End: 2020-06-08

## 2020-06-08 RX ORDER — IPRATROPIUM BROMIDE AND ALBUTEROL SULFATE 2.5; .5 MG/3ML; MG/3ML
SOLUTION RESPIRATORY (INHALATION)
COMMUNITY
Start: 2020-03-18 | End: 2020-06-08

## 2020-06-08 ASSESSMENT — ENCOUNTER SYMPTOMS
CHILLS: 0
DIARRHEA: 0
VOMITING: 0
NAUSEA: 0
WHEEZING: 0
FEVER: 0
SHORTNESS OF BREATH: 0
PALPITATIONS: 1
ABDOMINAL PAIN: 0
BLURRED VISION: 0

## 2020-06-08 ASSESSMENT — FIBROSIS 4 INDEX: FIB4 SCORE: 0.43

## 2020-06-08 NOTE — PROGRESS NOTES
"Subjective:   Stefania Garrison is a 39 y.o. female here today for   Chief Complaint   Patient presents with   • Establish Care   • Hospital Follow-up   • Nicotine Dependence     discuss quitting options.        #Establish care    #Hospital follow-up  -Days ago on 6/6/2020 patient was seen in the emergency department after experiencing an episode of numbness and tingling both hands and feet and legs bilaterally.  Patient states that the events were precipitated by an extremely rapid and \"irregular\" heartbeat.  Patient states that she is had similar episodes, 1 occurring last August where she was hospitalized.  During the emergency department visit full work-up was completed including labs and echocardiogram which all were unremarkable.  Today she states that she is feeling well.  In discussion about the these again she states that she has had at times the \"chest fluttering\" and irregular heart rate before.  She states that this always precedes the symptoms.  -Denies any loss of consciousness, difficulty speaking, difficulty seeing, facial drooping.  Here for further evaluation.    #Nicotine dependence  -Since 19 years old   -previous attempts at quitting: had tried wellbutrin, patches chantix. Chantix worked well although she states that she feels like she quit the Chantix too early.  -Last attempt 5 years ago with Wellbutrin.  -2 packs a day.  -Here today to discuss restarting Chantix to help quit smoking at this time.      Allergies   Allergen Reactions   • Pcn [Penicillins] Anaphylaxis         Current medicines (including changes today)  No current outpatient medications on file.     No current facility-administered medications for this visit.      She  has no past medical history on file.    ROS   Review of Systems   Constitutional: Negative for chills and fever.   HENT: Negative for hearing loss.    Eyes: Negative for blurred vision.   Respiratory: Negative for shortness of breath and wheezing.  " "  Cardiovascular: Positive for palpitations. Negative for chest pain.   Gastrointestinal: Negative for abdominal pain, diarrhea, nausea and vomiting.   Skin: Negative for rash.      Objective:     Physical Exam:  /72 (BP Location: Right arm, Patient Position: Sitting, BP Cuff Size: Adult)   Pulse 90   Temp 37 °C (98.6 °F) (Temporal)   Resp 15   Ht 1.6 m (5' 3\")   Wt 79.4 kg (175 lb)   SpO2 99%  Body mass index is 31 kg/m².   Constitutional: Alert, no distress.  Skin: Warm, dry, good turgor, no rashes in visible areas.  Eye: Equal, round and reactive, conjunctiva clear, lids normal.  ENMT: TM's clear bilaterally, lips without lesions, good dentition, oropharynx clear.  Neck: Trachea midline, no masses, no thyromegaly. No cervical or supraclavicular lymphadenopathy.  Respiratory: Unlabored respiratory effort, lungs clear to auscultation, no wheezes, no rhonchi.  Cardiovascular: Normal S1, S2, no murmur, no edema.  Abdomen: Soft, non-tender, no masses, no hepatosplenomegaly.  Psych: Alert and oriented x3, normal affect and mood.  Neuro: Cranial nerves I through XII intact, no focal motor/sensory deficits.    Assessment and Plan:     1. Encounter to establish care  -Reviewed all past medical history, family history, social history.  -Tobacco use as above.  No other recreational drug use, occasional alcohol use.  -Currently not undergoing any diet or exercise regimen, encouraged beginning a good diet etc.  -Follow-up for yearly physical examination.  Pap smear is due, will make appointment for Pap smear.    2. Tobacco abuse  -At this time will restart Chantix at this time.  Discussed the physical addiction to nicotine as well as the habits formed with smoking and the importance of working on both.  Patient will follow-up in 1 week to see how she is doing.  -Discussed with patient side effects of medication.  Patient understands and agrees to plan.  - varenicline (CHANTIX STARTING MONTH MUSA) 0.5 MG X 11 & 1 MG " X 42 tablet; Please follow packaging for appropriate doses  Dispense: 56 Tab; Refill: 3  - varenicline (CHANTIX CONTINUING MONTH MUSA) 1 MG tablet; Take 1 Tab by mouth 2 times a day.  Dispense: 60 Tab; Refill: 3    3. Palpitations  -Physical examination is normal, no concerns today; however, her history is concerning for possible cardiac etiology of symptoms.  Reviewed all previous labs, EKG and all unremarkable.  At this time will need Holter for further information.  Will order at this time, follow-up in 1 month.  - HOLTER - Cardiology Performed (48HR); Future      Followup: Return in about 4 weeks (around 7/6/2020).         PLEASE NOTE: This dictation was created using voice recognition software. I have made every reasonable attempt to correct obvious errors, but I expect that there are errors of grammar and possibly content that I did not discover before finalizing the note.

## 2020-06-13 LAB — EKG IMPRESSION: NORMAL

## 2020-07-07 ENCOUNTER — NON-PROVIDER VISIT (OUTPATIENT)
Dept: CARDIOLOGY | Facility: MEDICAL CENTER | Age: 40
End: 2020-07-07
Payer: COMMERCIAL

## 2020-07-07 DIAGNOSIS — R00.1 BRADYCARDIA: ICD-10-CM

## 2020-07-07 DIAGNOSIS — R00.2 PALPITATIONS: ICD-10-CM

## 2020-07-07 PROCEDURE — 93224 XTRNL ECG REC UP TO 48 HRS: CPT | Performed by: INTERNAL MEDICINE

## 2020-07-10 DIAGNOSIS — R00.2 PALPITATIONS: ICD-10-CM

## 2020-07-10 LAB — EKG IMPRESSION: NORMAL

## 2020-07-13 ENCOUNTER — OFFICE VISIT (OUTPATIENT)
Dept: MEDICAL GROUP | Facility: LAB | Age: 40
End: 2020-07-13
Payer: COMMERCIAL

## 2020-07-13 VITALS
HEART RATE: 95 BPM | TEMPERATURE: 98.3 F | HEIGHT: 63 IN | WEIGHT: 172 LBS | DIASTOLIC BLOOD PRESSURE: 72 MMHG | OXYGEN SATURATION: 98 % | RESPIRATION RATE: 13 BRPM | SYSTOLIC BLOOD PRESSURE: 114 MMHG | BODY MASS INDEX: 30.48 KG/M2

## 2020-07-13 DIAGNOSIS — R00.2 PALPITATIONS: ICD-10-CM

## 2020-07-13 DIAGNOSIS — Z72.0 TOBACCO ABUSE: ICD-10-CM

## 2020-07-13 DIAGNOSIS — R21 RASH AT APPLICATION SITE: ICD-10-CM

## 2020-07-13 PROCEDURE — 99214 OFFICE O/P EST MOD 30 MIN: CPT | Performed by: FAMILY MEDICINE

## 2020-07-13 RX ORDER — TRIAMCINOLONE ACETONIDE 1 MG/G
CREAM TOPICAL
Qty: 1 TUBE | Refills: 1 | Status: SHIPPED
Start: 2020-07-13 | End: 2020-12-24

## 2020-07-13 RX ORDER — VARENICLINE TARTRATE 1 MG/1
1 TABLET, FILM COATED ORAL 2 TIMES DAILY
Qty: 60 TAB | Refills: 3 | Status: SHIPPED
Start: 2020-07-13 | End: 2020-12-24

## 2020-07-13 ASSESSMENT — FIBROSIS 4 INDEX: FIB4 SCORE: 0.43

## 2020-07-13 NOTE — PROGRESS NOTES
Subjective:   Stefania Garrison is a 39 y.o. female here today for   Chief Complaint   Patient presents with   • Follow-Up     #Smoking cessation   -Currently down to less than 1/5 pack a day.  This is a decrease from 2 packs a day; however, patient has increased her vaping.  She is vaping with nicotine.  -Difficuluty in the mrooing with coffee.   -Patient had recently attempted smoking cessation with nicotine patches; however, patient had a severe allergic reaction to the adhesive on the patches causing blistering, skin rash.  Patient also attempted cessation with nicotine gum.  She states this lucia in the gum (had tried several different brands) had caused significant amount of diarrhea and GI upset.  -Patient has a history of attempt with Wellbutrin years ago but was not successful in quitting at that time given side effects Wellbutrin.    #Palpitations:  -Patient was previously seen in June for palpitations, given no etiology at that time a Holter monitor was placed on patient.  She is here to follow-up with the results.  She states that since the Holter monitor she has not felt irregular palpitations as she did previously.  She does state that she had some fatigue.  Denies any chest pain.  Denies any fevers, chills, difficulty breathing, shortness of breath.    Allergies   Allergen Reactions   • Pcn [Penicillins] Anaphylaxis   • Chantix [Varenicline] Hives, Rash and Itching     Reaction from the patches, itching, hives, rash.          Current medicines (including changes today)  Current Outpatient Medications   Medication Sig Dispense Refill   • varenicline (CHANTIX STARTING MONTH MUSA) 0.5 MG X 11 & 1 MG X 42 tablet Please follow packaging for appropriate doses 56 Tab 3   • varenicline (CHANTIX CONTINUING MONTH MUSA) 1 MG tablet Take 1 Tab by mouth 2 times a day. 60 Tab 3     No current facility-administered medications for this visit.      She  has no past medical history on file.    ROS   Review of Systems  "  Constitutional: Negative for chills and fever.   HENT: Negative for hearing loss.    Eyes: Negative for blurred vision.   Respiratory: Negative for shortness of breath and wheezing.    Cardiovascular: Negative for chest pain and palpitations.   Skin: Positive for rash.      Objective:     Physical Exam:  Ht 1.6 m (5' 3\")  Body mass index is 31 kg/m².   Constitutional: Alert, no distress.  Skin: Warm, dry, good turgor, no rashes in visible areas.  Several areas across chest and back of 2 to 3 cm papular rash and varying degrees of healing.  Eye: Equal, round and reactive, conjunctiva clear, lids normal.  ENMT: TM's clear bilaterally, lips without lesions, good dentition, oropharynx clear.  Neck: Trachea midline, no masses, no thyromegaly. No cervical or supraclavicular lymphadenopathy.  Respiratory: Unlabored respiratory effort, lungs clear to auscultation, no wheezes, no rhonchi.  Cardiovascular: Normal S1, S2, no murmur, no edema.  Abdomen: Soft, non-tender, no masses, no hepatosplenomegaly.  Psych: Alert and oriented x3, normal affect and mood.    Assessment and Plan:     1. Tobacco abuse  -At this time given the failure of both patches, gum, Wellbutrin, I recommend that we now try Chantix.  Preauthorization will be needed and we will work on this visit is received in my office.  -Discussed with her again the importance of behavioral modifications, also discussed with her the risk of continued use of vaping with nicotine.  Patient will work on discontinuing this as well.  -Follow-up in 2 to 3 months.  - varenicline (CHANTIX STARTING MONTH PAK) 0.5 MG X 11 & 1 MG X 42 tablet; Please follow packaging for appropriate doses  Dispense: 56 Tab; Refill: 3  - varenicline (CHANTIX CONTINUING MONTH PAK) 1 MG tablet; Take 1 Tab by mouth 2 times a day.  Dispense: 60 Tab; Refill: 3    2. Palpitations  -Reviewed Holter monitor studies.  Findings, nothing out of the ordinary.  I suspect that palpitations patient experiencing " is usually from anxiety.  Discussed the possibility of starting treatment for anxiety, patient declines at this time.  -No red flag symptoms.  Return precautions were given, follow-up as needed.    3. Rash at application site  -New problem  -Rash across chest and body from adhesive of the nicotine patch as well as for a Holter monitor.  Will prescribe triamcinolone at this time.  Follow-up as needed.  - triamcinolone acetonide (KENALOG) 0.1 % Cream; Apply to the affected area on chest twice a day.  Dispense: 1 Tube; Refill: 1      Followup: Return in about 2 months (around 9/13/2020).         PLEASE NOTE: This dictation was created using voice recognition software. I have made every reasonable attempt to correct obvious errors, but I expect that there are errors of grammar and possibly content that I did not discover before finalizing the note.

## 2020-07-14 ASSESSMENT — ENCOUNTER SYMPTOMS
WHEEZING: 0
BLURRED VISION: 0
FEVER: 0
PALPITATIONS: 0
CHILLS: 0
SHORTNESS OF BREATH: 0

## 2020-07-23 ENCOUNTER — HOSPITAL ENCOUNTER (EMERGENCY)
Facility: MEDICAL CENTER | Age: 40
End: 2020-07-23
Attending: EMERGENCY MEDICINE
Payer: COMMERCIAL

## 2020-07-23 ENCOUNTER — APPOINTMENT (OUTPATIENT)
Dept: RADIOLOGY | Facility: MEDICAL CENTER | Age: 40
End: 2020-07-23
Attending: EMERGENCY MEDICINE
Payer: COMMERCIAL

## 2020-07-23 VITALS
RESPIRATION RATE: 20 BRPM | HEIGHT: 62 IN | DIASTOLIC BLOOD PRESSURE: 71 MMHG | TEMPERATURE: 97.2 F | WEIGHT: 174.38 LBS | BODY MASS INDEX: 32.09 KG/M2 | OXYGEN SATURATION: 100 % | HEART RATE: 78 BPM | SYSTOLIC BLOOD PRESSURE: 112 MMHG

## 2020-07-23 DIAGNOSIS — R00.2 PALPITATIONS: ICD-10-CM

## 2020-07-23 LAB
ALBUMIN SERPL BCP-MCNC: 4.6 G/DL (ref 3.2–4.9)
ALBUMIN/GLOB SERPL: 1.6 G/DL
ALP SERPL-CCNC: 75 U/L (ref 30–99)
ALT SERPL-CCNC: 32 U/L (ref 2–50)
ANION GAP SERPL CALC-SCNC: 17 MMOL/L (ref 7–16)
AST SERPL-CCNC: 28 U/L (ref 12–45)
BASOPHILS # BLD AUTO: 1.1 % (ref 0–1.8)
BASOPHILS # BLD: 0.11 K/UL (ref 0–0.12)
BILIRUB SERPL-MCNC: 0.4 MG/DL (ref 0.1–1.5)
BUN SERPL-MCNC: 9 MG/DL (ref 8–22)
CALCIUM SERPL-MCNC: 9.2 MG/DL (ref 8.5–10.5)
CHLORIDE SERPL-SCNC: 101 MMOL/L (ref 96–112)
CO2 SERPL-SCNC: 18 MMOL/L (ref 20–33)
CREAT SERPL-MCNC: 0.68 MG/DL (ref 0.5–1.4)
EKG IMPRESSION: NORMAL
EOSINOPHIL # BLD AUTO: 0.88 K/UL (ref 0–0.51)
EOSINOPHIL NFR BLD: 8.7 % (ref 0–6.9)
ERYTHROCYTE [DISTWIDTH] IN BLOOD BY AUTOMATED COUNT: 43 FL (ref 35.9–50)
GLOBULIN SER CALC-MCNC: 2.9 G/DL (ref 1.9–3.5)
GLUCOSE SERPL-MCNC: 118 MG/DL (ref 65–99)
HCG SERPL QL: NEGATIVE
HCT VFR BLD AUTO: 42.2 % (ref 37–47)
HGB BLD-MCNC: 14.9 G/DL (ref 12–16)
IMM GRANULOCYTES # BLD AUTO: 0.02 K/UL (ref 0–0.11)
IMM GRANULOCYTES NFR BLD AUTO: 0.2 % (ref 0–0.9)
LYMPHOCYTES # BLD AUTO: 3.64 K/UL (ref 1–4.8)
LYMPHOCYTES NFR BLD: 35.9 % (ref 22–41)
MCH RBC QN AUTO: 33.2 PG (ref 27–33)
MCHC RBC AUTO-ENTMCNC: 35.3 G/DL (ref 33.6–35)
MCV RBC AUTO: 94 FL (ref 81.4–97.8)
MONOCYTES # BLD AUTO: 0.7 K/UL (ref 0–0.85)
MONOCYTES NFR BLD AUTO: 6.9 % (ref 0–13.4)
NEUTROPHILS # BLD AUTO: 4.78 K/UL (ref 2–7.15)
NEUTROPHILS NFR BLD: 47.2 % (ref 44–72)
NRBC # BLD AUTO: 0 K/UL
NRBC BLD-RTO: 0 /100 WBC
PLATELET # BLD AUTO: 379 K/UL (ref 164–446)
PMV BLD AUTO: 9.5 FL (ref 9–12.9)
POTASSIUM SERPL-SCNC: 3.6 MMOL/L (ref 3.6–5.5)
PROT SERPL-MCNC: 7.5 G/DL (ref 6–8.2)
RBC # BLD AUTO: 4.49 M/UL (ref 4.2–5.4)
SODIUM SERPL-SCNC: 136 MMOL/L (ref 135–145)
TROPONIN T SERPL-MCNC: <6 NG/L (ref 6–19)
WBC # BLD AUTO: 10.1 K/UL (ref 4.8–10.8)

## 2020-07-23 PROCEDURE — 93005 ELECTROCARDIOGRAM TRACING: CPT | Performed by: EMERGENCY MEDICINE

## 2020-07-23 PROCEDURE — 71045 X-RAY EXAM CHEST 1 VIEW: CPT

## 2020-07-23 PROCEDURE — 85025 COMPLETE CBC W/AUTO DIFF WBC: CPT

## 2020-07-23 PROCEDURE — 84484 ASSAY OF TROPONIN QUANT: CPT

## 2020-07-23 PROCEDURE — 700105 HCHG RX REV CODE 258: Performed by: EMERGENCY MEDICINE

## 2020-07-23 PROCEDURE — 93005 ELECTROCARDIOGRAM TRACING: CPT

## 2020-07-23 PROCEDURE — 84703 CHORIONIC GONADOTROPIN ASSAY: CPT

## 2020-07-23 PROCEDURE — 99284 EMERGENCY DEPT VISIT MOD MDM: CPT

## 2020-07-23 PROCEDURE — 80053 COMPREHEN METABOLIC PANEL: CPT

## 2020-07-23 RX ORDER — SODIUM CHLORIDE 9 MG/ML
1000 INJECTION, SOLUTION INTRAVENOUS ONCE
Status: COMPLETED | OUTPATIENT
Start: 2020-07-23 | End: 2020-07-23

## 2020-07-23 RX ADMIN — SODIUM CHLORIDE 1000 ML: 9 INJECTION, SOLUTION INTRAVENOUS at 21:01

## 2020-07-23 ASSESSMENT — FIBROSIS 4 INDEX: FIB4 SCORE: 0.43

## 2020-07-24 NOTE — ED TRIAGE NOTES
Pt ambulatory to triage c/o palpitation and headache with nausea that began last night. Pt states hx of same but never lasting all day. nad

## 2020-07-24 NOTE — ED PROVIDER NOTES
"ED Provider Note    Scribed for Tessy Cho M.D. by Adelaida Romero. 7/23/2020  8:29 PM    Means of arrival: Walk-in  History obtained from: Patient  History limited by: None      CHIEF COMPLAINT  Chief Complaint   Patient presents with   • Palpitations       HPI  Stefania Garrison is a 39 y.o. female who presents to the Emergency Department for evaluation of acute palpitations onset last night. She reports that she has had them in the past and usually if she lays down it will resolve, but it hasn't since last night. She describes it as an intermittent fluttering sensation and feels as if \"an elephant is sitting on my chest\". She reports that it isn't painful, but feels heavy. She has seen her PCP Dr. Jara for these palpitations and recently had a Holter monitor completed.  He told her that there was no concerning findings on this and did recommend a sleep study due to bradycardia while sleeping.  She denies any associated cough, fever, vomiting, or diarrhea. She is not currently on any daily medications except Chantix. She denies any possibility of pregnancy.     REVIEW OF SYSTEMS  Pertinent positive include palpitations and chest pressure. Pertinent negative include no cough, fever, vomiting, or diarrhea. All other systems reviewed and are negative.      PAST MEDICAL HISTORY       SOCIAL HISTORY  Social History     Tobacco Use   • Smoking status: Light Tobacco Smoker     Packs/day: 1.50     Years: 20.00     Pack years: 30.00     Types: Cigarettes   • Smokeless tobacco: Never Used   • Tobacco comment: 1-2 ppd   Substance and Sexual Activity   • Alcohol use: Yes     Frequency: 2-4 times a month     Drinks per session: 3 or 4     Binge frequency: Less than monthly   • Drug use: Not Currently     Types: Marijuana, Inhaled   • Sexual activity: Yes     Birth control/protection: Surgical       SURGICAL HISTORY   has a past surgical history that includes cholecystectomy (2018).    CURRENT MEDICATIONS  Home " "Medications     Reviewed by Florecita Ramos R.N. (Registered Nurse) on 07/23/20 at 1906  Med List Status: Partial   Medication Last Dose Status   triamcinolone acetonide (KENALOG) 0.1 % Cream  Active   varenicline (CHANTIX CONTINUING MONTH PAK) 1 MG tablet  Active   varenicline (CHANTIX STARTING MONTH PAK) 0.5 MG X 11 & 1 MG X 42 tablet  Active                ALLERGIES  Allergies   Allergen Reactions   • Pcn [Penicillins] Anaphylaxis   • Chantix [Varenicline] Hives, Rash and Itching     Reaction from the patches, itching, hives, rash. Reaction from gum, diarrhea.        PHYSICAL EXAM   VITAL SIGNS: /84   Pulse 89   Temp 36.2 °C (97.2 °F) (Temporal)   Resp 16   Ht 1.575 m (5' 2\")   Wt 79.1 kg (174 lb 6.1 oz)   LMP 06/08/2020   SpO2 98%   BMI 31.90 kg/m²    Constitutional: Well appearing young female, Alert in no apparent distress.  HENT: Normocephalic, Atraumatic. Bilateral external ears normal. Nose normal.  Moist mucous membranes.  Oropharynx clear.  Eyes: Pupils are equal and reactive. Conjunctiva normal.   Neck: Supple, full range of motion  Heart: Regular rate and rhythm.  No murmurs.    Lungs: No respiratory distress, normal work of breathing. Lungs clear to auscultation bilaterally.  Abdomen Soft, no distention.  No tenderness to palpation.  Musculoskeletal: Atraumatic. No obvious deformities noted.  No lower extremity edema.  Skin: Warm, Dry.  No erythema, No rash.   Neurologic: Alert and oriented x3. Moving all extremities spontaneously without focal deficits.  Psychiatric: Affect normal, Mood normal, Appears appropriate and not intoxicated.      DIAGNOSTIC STUDIES    EKG  Results for orders placed or performed during the hospital encounter of 07/23/20   EKG (NOW)   Result Value Ref Range    Report       [facility] Emergency Dept.    Test Date:  2020-07-23  Pt Name:    JOCELYN WILDE               Department: ER  MRN:        [id number]                      Room:  Gender:     " "Female                       Technician: [id number]  :        1980                   Requested By:ER TRIAGE PROTOCOL  Order #:    [id number]                    Reading MD: Tessy Cho MD    Measurements  Intervals                                Axis  Rate:       81                           P:          -19  AL:         140                          QRS:        47  QRSD:       84                           T:          61  QT:         372  QTc:        432    Interpretive Statements  SINUS RHYTHM  Normal axis and intervals  No ectopy or hypertrophy  No ST or T wave change  Compared to ECG 2020 13:49:22  No significant changes  Electronically Signed On 2020 20:45:13 PDT by Tessy Cho MD         LABS  Personally reviewed by me  Labs Reviewed   CBC WITH DIFFERENTIAL - Abnormal; Notable for the following components:       Result Value    MCH 33.2 (*)     MCHC 35.3 (*)     Eosinophils 8.70 (*)     Eos (Absolute) 0.88 (*)     All other components within normal limits   COMP METABOLIC PANEL - Abnormal; Notable for the following components:    Co2 18 (*)     Anion Gap 17.0 (*)     Glucose 118 (*)     All other components within normal limits   TROPONIN   HCG QUAL SERUM   ESTIMATED GFR         RADIOLOGY  Personally reviewed by me  DX-CHEST-PORTABLE (1 VIEW)   Final Result         1.  No acute cardiopulmonary disease.          ED COURSE  Vitals:    20 1901 20 1904 20 2203 20 220   BP: 149/84  112/71 112/71   Pulse: 89  79 78   Resp: 16  16 20   Temp: 36.2 °C (97.2 °F)      TempSrc: Temporal      SpO2: 98%  100% 100%   Weight:  79.1 kg (174 lb 6.1 oz)     Height: 1.575 m (5' 2\")            Medications administered:  Medications   NS infusion 1,000 mL (0 mL Intravenous Stopped 20)     Patient was given IV fluids for possible dehydration.  IV hydration was used because oral hydration was not adequate alone.  Following fluid administration patient's symptoms were " improved.      Old records personally reviewed:  She had a Holter monitor for 48 hours, some PVCs and bradycardia. Followed up with PCP who thought symptoms may be attributed to anxiety. TSH normal in beginning of June.     8:29 PM Patient seen and examined at bedside. The patient presents with palpitations onset last night. Ordered for EKG, DX-chest, HCG Qual Serum, CBC with Differential, CMP, and Troponin to evaluate. Patient will be treated with IV fluids for her symptoms.     MEDICAL DECISION MAKING  Otherwise healthy patient presents with ongoing history of palpitations and chest pressure.  She is afebrile with normal vital signs on arrival and well-appearing.  Her EKG does not show signs of ischemia or arrhythmia.  Troponin is negative.  Clinically doubt ACS or pulmonary embolism.  Chest x-ray does not show pneumonia, pneumothorax, pulmonary edema.  Patient is not pregnant.  TSH was normal last month.    I did review her recent Holter monitor which showed PVCs and no significant findings of arrhythmia.  The patient is being followed closely by her primary care physician and I feel like they can continue to do this work-up as an outpatient.  She may need cardiology referral and they may consider stress test although my suspicion for ACS is low.  She has a HEART score of 0 making risk for adverse cardiac events very low in the next 6 weeks.    10:18 PM - Upon reassessment, patient is resting comfortably with normal vital signs.  No new complaints at this time.  Discussed results with patient and/or family as well as importance of primary care follow up.  Patient understands plan of care and strict return precautions for new or changing symptoms.         The patient will return for new or worsening symptoms and is stable at the time of discharge.    The patient is referred to a primary physician for blood pressure management, diabetic screening, and for all other preventative health  concerns.    DISPOSITION:  Patient will be discharged home in stable condition.    FOLLOW UP:  Naresh Jara M.D.  [address] [place] [id number] [phone]    Schedule an appointment as soon as possible for a visit       [facility], Emergency Dept  [address]  [place] [id number] [phone]    If symptoms worsen      OUTPATIENT MEDICATIONS:  Discharge Medication List as of 7/23/2020 10:38 PM          IMPRESSION  (R00.2) Palpitations    Results, diagnoses, and treatment options were discussed with the patient and/or family. Patient verbalized understanding of plan of care.     Adelaida VARGAS (Marieibe), am scribing for, and in the presence of, Tessy Cho M.D..    Electronically signed by: Adelaida Romero (Scribe), 7/23/2020    Tessy VARGAS M.D. personally performed the services described in this documentation, as scribed by Adelaida Romero in my presence, and it is both accurate and complete. C    The note accurately reflects work and decisions made by me.  Tessy Cho M.D.  7/24/2020  4:54 AM

## 2020-07-24 NOTE — DISCHARGE INSTRUCTIONS
You were seen in the Emergency Department for palpitations.    EKG, labs, chest xray were completed without significant acute abnormalities.    Please use 1,000mg of tylenol or 600mg of ibuprofen every 6 hours as needed for pain.  Drink plenty of fluids.    Please follow up with your primary care physician for stress test.    Return to the Emergency Department with worsening chest pain, trouble breathing, fainting, or other concerns .

## 2020-10-21 NOTE — ED NOTES
Patient Education     Anemia  Anemia is a condition that occurs when your body does not have enough healthy red blood cells (RBCs). RBCs are the parts of your blood that carry oxygen throughout your body. A protein called hemoglobin allows your RBCs to absorb and release oxygen. Without enough RBCs or hemoglobin, your body doesn't get enough oxygen. Symptoms of anemia may then occur.    What are the symptoms of anemia?  Some people with anemia have no symptoms. But most people have symptoms that range from mild to severe. These can include:    Tiredness (fatigue)    Weakness    Pale skin    Shortness of breath    Dizziness or fainting    Rapid heartbeat    Trouble doing normal amounts of activity    Jaundice (yellowing of your eyes, skin, or mouth; dark urine)  What causes anemia?  Anemia can occur when your body:    Loses too much blood    Does not make enough RBCs    Destroys your RBCs at a faster rate than it can replace them    Does not make a normal amount of hemoglobin in your RBCs  These problems can occur for many reasons, including:    A condition that you are born with (congenital or inherited), such as sickle cell disease or thalassemia    Heavy bleeding for any reason, including injury, surgery, childbirth, or even heavy menstrual periods    Being low in certain nutrients, such as iron, folate, or vitamin B12, possibly from a poor diet or a condition like celiac disease or Crohn's disease    Certain chronic conditions like diabetes, arthritis, or kidney disease    Certain chronic infections like tuberculosis or HIV    Exposure to certain medicines, such as those used for chemotherapy  There are different types of anemia. Your healthcare provider can tell you more about the type of anemia you have and what may have caused it.  How is anemia diagnosed?  To diagnose anemia, your healthcare provider orders blood tests. These can include:    Complete blood cell count (CBC). This test measures the amounts of  Pt resting quietly, no distress noted.    the different types of blood cells.    Blood smear. This test checks the size and shape of your blood cells. To do the test, a drop of your blood is viewed under a microscope. A stain is used to make the blood cells easier to see.    Iron studies. These tests measure the amount of iron in your blood. Your body needs iron to make hemoglobin in your RBCs.    Vitamin B12 and folate studies. These tests check for some of the components that help give RBCs a normal size and shape.    Reticulocyte count. This test measures the amount of new RBCs that your bone marrow makes.    Hemoglobin electrophoresis. This test checks for problems with your hemoglobin in RBCs.  How is anemia treated?  Treatment for anemia is based on the type of anemia, its cause, and the severity of your symptoms. Treatments may include:    Diet changes. This involves increasing the amount of certain nutrients in your diet, such as iron, vitamin B12, or folate. Your healthcare provider may also prescribe nutrient supplements.    Medicines. Certain medicines treat the cause of your anemia. Others help build new RBCs or relieve symptoms. If a medicine is the cause of your anemia, you may need to stop or change it.    Blood transfusions. Replacing some of your blood can increase the number of healthy RBCs in your body.    Surgery. In some cases, your doctor may do surgery to treat the underlying cause of anemia. If you need surgery, your healthcare provider will explain the procedure and outline the risks and benefits for you.  What are the long-term concerns?  If you have a certain type of anemia, you can expect a full recovery after treatment. If you have other types of anemia (especially a type you're born with), you will need to manage it for life. Your doctor can tell you more.  Date Last Reviewed: 12/1/2016 2000-2019 [employer]. [address] [place] [address], [place] [address]. All rights reserved. This information is not intended as a  substitute for professional medical care. Always follow your healthcare professional's instructions.

## 2020-11-13 ENCOUNTER — TELEMEDICINE (OUTPATIENT)
Dept: MEDICAL GROUP | Facility: LAB | Age: 40
End: 2020-11-13
Payer: COMMERCIAL

## 2020-11-13 ENCOUNTER — PATIENT MESSAGE (OUTPATIENT)
Dept: MEDICAL GROUP | Facility: LAB | Age: 40
End: 2020-11-13

## 2020-11-13 DIAGNOSIS — Z03.818 ENCNTR FOR OBS FOR SUSP EXPSR TO OTH BIOLG AGENTS RULED OUT: ICD-10-CM

## 2020-11-13 DIAGNOSIS — F51.04 PSYCHOPHYSIOLOGICAL INSOMNIA: ICD-10-CM

## 2020-11-13 DIAGNOSIS — G44.209 TENSION HEADACHE: ICD-10-CM

## 2020-11-13 PROBLEM — F41.9 ANXIETY: Status: ACTIVE | Noted: 2020-11-13

## 2020-11-13 PROCEDURE — 99214 OFFICE O/P EST MOD 30 MIN: CPT | Mod: 95,CR | Performed by: FAMILY MEDICINE

## 2020-11-13 RX ORDER — TRAZODONE HYDROCHLORIDE 50 MG/1
TABLET ORAL
Qty: 60 TAB | Refills: 1 | Status: SHIPPED
Start: 2020-11-13 | End: 2020-12-24

## 2020-11-13 ASSESSMENT — ANXIETY QUESTIONNAIRES
3. WORRYING TOO MUCH ABOUT DIFFERENT THINGS: SEVERAL DAYS
6. BECOMING EASILY ANNOYED OR IRRITABLE: MORE THAN HALF THE DAYS
7. FEELING AFRAID AS IF SOMETHING AWFUL MIGHT HAPPEN: SEVERAL DAYS
5. BEING SO RESTLESS THAT IT IS HARD TO SIT STILL: NOT AT ALL
4. TROUBLE RELAXING: NEARLY EVERY DAY
2. NOT BEING ABLE TO STOP OR CONTROL WORRYING: SEVERAL DAYS
GAD7 TOTAL SCORE: 10
1. FEELING NERVOUS, ANXIOUS, OR ON EDGE: MORE THAN HALF THE DAYS
1. FEELING NERVOUS, ANXIOUS, OR ON EDGE: MORE THAN HALF THE DAYS

## 2020-11-13 NOTE — LETTER
November 13, 2020       Patient: Stefania Garrison   YOB: 1980   Date of Visit: 11/13/2020         To Whom It May Concern:    In my medical opinion, I recommend that Stefania Garrison remain out of work until we have her results back from her COVID-19 test.     If you have any questions or concerns, please don't hesitate to call [phone]          Sincerely,          Naresh Jara M.D.  Electronically Signed

## 2020-11-13 NOTE — PROGRESS NOTES
"Virtual Visit: Established Patient   This visit was conducted via Zoom using secure and encrypted videoconferencing technology. The patient was in a private location in the [facility].    The patient's identity was confirmed and verbal consent was obtained for this virtual visit.    Subjective:   CC:   Chief Complaint   Patient presents with   • Insomnia     X headaches,Taking tynelol, Tension headache. Waking her up at night, stress, Trying to uses nyquil and IBU, to help, not helping.        Stefania Garrison is a 39 y.o. female presenting for evaluation and management of:    #Insomnia:  -Patient states her last several months she has been having difficulty with sleeping.  She states that while she does have some difficulty going to sleep at times, mainly the problem is staying asleep.  She states that she will wake up at night and have to spend several hours to try to get her cell back sleep.  She states that she is averaging approximately 4 hours of sleep at night.  Is gotten so bad that she has been getting headaches in the middle of the night which she describes as a tension headache.  She states that she has a lot of stress in her life right now dealing with effects from COVID-19, helping children through [employer], and other family stress.  -She has been work on good bedtime routine, avoiding electronics and cell phones before bed, taking shower at night.  -Patient states that she does have some anxiety, denies any depression, denies any suicidal/homicidal ideations.    #Tension headaches:  -Patient states that she is getting daily headaches, mostly at night, that she describes as intense pain that is nonthrobbing, starting the back of her neck and rotating to the front area of her head.  She states that it normally \"rest\" behind her eyes.  She describes it as a \"vice\" around her head squeezing pressure in her sinuses.  She has been taking Tylenol PM which has been helping with small degree.  She " states the tension headaches have been getting worse that she has not been sleeping    #COVID-19 testing:  -Patient has a son who is in direct contact with someone is contracted COVID-19.  She discussed this with her work, her work states that she is not able to come in until she has been tested negative and is requiring testing at this time.  -Patient is asymptomatic at this time, denies any fevers, chills, chest pain, shortness of breath, or other URI symptoms.    ROS   Denies any recent fevers or chills. No nausea or vomiting. No chest pains or shortness of breath.     Allergies   Allergen Reactions   • Pcn [Penicillins] Anaphylaxis   • Chantix [Varenicline] Hives, Rash and Itching     Reaction from the patches, itching, hives, rash. Reaction from gum, diarrhea.        Current medicines (including changes today)  Current Outpatient Medications   Medication Sig Dispense Refill   • triamcinolone acetonide (KENALOG) 0.1 % Cream Apply to the affected area on chest twice a day. 1 Tube 1   • varenicline (CHANTIX STARTING MONTH PAK) 0.5 MG X 11 & 1 MG X 42 tablet Please follow packaging for appropriate doses 56 Tab 3   • varenicline (CHANTIX CONTINUING MONTH MUSA) 1 MG tablet Take 1 Tab by mouth 2 times a day. 60 Tab 3     No current facility-administered medications for this visit.        Patient Active Problem List    Diagnosis Date Noted   • Tobacco abuse 06/08/2020   • TIA (transient ischemic attack) 08/28/2019   • Aphasia 08/28/2019       No family history on file.    She  has no past medical history on file.  She  has a past surgical history that includes cholecystectomy (2018).       Objective:   There were no vitals taken for this visit.    Physical Exam:  Constitutional: Alert, no distress, well-groomed.  Skin: No rashes in visible areas.  Eye: Round. Conjunctiva clear, lids normal. No icterus.   ENMT: Lips pink without lesions, good dentition, moist mucous membranes. Phonation normal.  Neck: No masses, no  thyromegaly. Moves freely without pain.  Respiratory: Unlabored respiratory effort, no cough or audible wheeze  Psych: Alert and oriented x3, normal affect and mood.     GIOVANNI-7 Questionnaire    Feeling nervous, anxious, or on edge: More than half the days  Not being able to sop or control worrying: Several days  Worrying too much about different things: Several days  Trouble relaxing: Nearly every day  Being so restless that it's hard to sit still: Not at all  Becoming easily annoyed or irritable: More than half the days  Feeling afraid as if something awful might happen: Several days  Total: 10      Assessment and Plan:   The following treatment plan was discussed:     1. Psychophysiological insomnia  -Patient to continue working on appropriate bedtime routine.  -Discussed appropriate beginning CBT principles.  -I believe that a lot of her insomnia is due to the stress and anxiety she is experiencing.  Discussed stress relieving techniques.  Have also been treatment this time with trazodone.  -We will follow-up with patient in 1 month  - traZODone (DESYREL) 50 MG Tab; Take 1-2 tabs po at night prn for sleep  Dispense: 60 Tab; Refill: 1    2. Tension headache  -Discussed conservative treatments at this time including heat, ice, ibuprofen, acetaminophen as needed.  -No red flag symptoms at this time.    3. Encntr for obs for susp expsr to oth biolg agents ruled out  -We will call patient with results once they are completed.  - COVID/SARS CoV-2 PCR; Future      Follow-up: Return in about 4 weeks (around 12/11/2020).

## 2020-11-13 NOTE — LETTER
November 23, 2020       Patient: Stefania Garrison   YOB: 1980   Date of Visit: 11/13/2020         To Whom It May Concern:    I have personally reviewed her labs and have found that Stefania has recently tested negative for COVID-19 on 11/17/2020.  In my medical opinion, I recommend that Stefania Garrison return to full duty, no restrictions.    If you have any questions or concerns, please don't hesitate to call [phone]          Sincerely,          Naresh Jara M.D.  Electronically Signed

## 2020-11-17 ENCOUNTER — HOSPITAL ENCOUNTER (OUTPATIENT)
Dept: LAB | Facility: MEDICAL CENTER | Age: 40
End: 2020-11-17
Attending: FAMILY MEDICINE
Payer: COMMERCIAL

## 2020-11-17 DIAGNOSIS — Z03.818 ENCNTR FOR OBS FOR SUSP EXPSR TO OTH BIOLG AGENTS RULED OUT: ICD-10-CM

## 2020-11-17 PROCEDURE — U0003 INFECTIOUS AGENT DETECTION BY NUCLEIC ACID (DNA OR RNA); SEVERE ACUTE RESPIRATORY SYNDROME CORONAVIRUS 2 (SARS-COV-2) (CORONAVIRUS DISEASE [COVID-19]), AMPLIFIED PROBE TECHNIQUE, MAKING USE OF HIGH THROUGHPUT TECHNOLOGIES AS DESCRIBED BY CMS-2020-01-R: HCPCS

## 2020-11-17 PROCEDURE — C9803 HOPD COVID-19 SPEC COLLECT: HCPCS

## 2020-11-18 LAB
COVID ORDER STATUS COVID19: NORMAL
SARS-COV-2 RNA RESP QL NAA+PROBE: NOTDETECTED
SPECIMEN SOURCE: NORMAL

## 2020-12-24 ENCOUNTER — HOSPITAL ENCOUNTER (EMERGENCY)
Facility: MEDICAL CENTER | Age: 40
End: 2020-12-24
Attending: EMERGENCY MEDICINE
Payer: COMMERCIAL

## 2020-12-24 VITALS
SYSTOLIC BLOOD PRESSURE: 106 MMHG | HEIGHT: 62 IN | TEMPERATURE: 98 F | RESPIRATION RATE: 18 BRPM | OXYGEN SATURATION: 98 % | WEIGHT: 180 LBS | BODY MASS INDEX: 33.13 KG/M2 | HEART RATE: 90 BPM | DIASTOLIC BLOOD PRESSURE: 60 MMHG

## 2020-12-24 DIAGNOSIS — F10.920 ALCOHOLIC INTOXICATION WITHOUT COMPLICATION (HCC): ICD-10-CM

## 2020-12-24 LAB — POC BREATHALIZER: 0.11 PERCENT (ref 0–0.01)

## 2020-12-24 PROCEDURE — 302970 POC BREATHALIZER: Performed by: EMERGENCY MEDICINE

## 2020-12-24 PROCEDURE — 99283 EMERGENCY DEPT VISIT LOW MDM: CPT

## 2020-12-24 ASSESSMENT — FIBROSIS 4 INDEX: FIB4 SCORE: 0.52

## 2020-12-24 NOTE — ED PROVIDER NOTES
"ED Provider Note    CHIEF COMPLAINT  Chief Complaint   Patient presents with   • ALOC     Pt was drinking tonight and now stating she can't get up and she has hip pain.        HPI    Primary care provider: Naresh Jara M.D.   History obtained from: Patient  History limited by: None     Stefania Brittni Garrison is a 40 y.o. female who presents to the ED by EMS.  On my exam, patient states \"I don't know why I am here and I just want to go home.\"  Per EMS, patient was drinking with friends tonight and apparently stated that she could not feel her toes or walk and so they called EMS.  Patient is ambulating without difficulty in the ED.  She denies any symptoms at this time.  She admits to drinking \"a lot\" of alcohol tonight.  She denies any drug use.  She denies suicidal or homicidal ideations.  She denies any pain.  She denies recent illness/fever/chills/congestion/runny nose/sore throat/cough/change in taste or smell/shortness of breath or difficulty breathing/nausea/vomiting/diarrhea/dysuria/rash.  She denies possibility of pregnancy.  She denies recent travels or known ill contacts.    REVIEW OF SYSTEMS  Please see HPI for pertinent positives/negatives.  All other systems reviewed and are negative.     PAST MEDICAL HISTORY  Past Medical History:   Diagnosis Date   • Tachy-alcides syndrome (HCC)         SURGICAL HISTORY  Past Surgical History:   Procedure Laterality Date   • CHOLECYSTECTOMY  2018        SOCIAL HISTORY  Social History     Tobacco Use   • Smoking status: Current Every Day Smoker     Packs/day: 1.50     Years: 20.00     Pack years: 30.00     Types: Cigarettes   • Smokeless tobacco: Never Used   • Tobacco comment: 1-2 ppd   Substance and Sexual Activity   • Alcohol use: Yes     Frequency: 2-4 times a month     Drinks per session: 3 or 4     Binge frequency: Less than monthly   • Drug use: Not Currently     Types: Marijuana, Inhaled   • Sexual activity: Yes     Birth control/protection: Surgical    " "    FAMILY HISTORY  History reviewed. No pertinent family history.     CURRENT MEDICATIONS  Home Medications     Reviewed by Lorenza Gomez R.N. (Registered Nurse) on 12/24/20 at 0056  Med List Status: Partial   Medication Last Dose Status   Atorvastatin Calcium (LIPITOR PO)  Active   LORazepam (ATIVAN PO)  Active                 ALLERGIES  Allergies   Allergen Reactions   • Pcn [Penicillins] Anaphylaxis   • Chantix [Varenicline] Hives, Rash and Itching     Reaction from the patches, itching, hives, rash. Reaction from gum, diarrhea.         PHYSICAL EXAM  VITAL SIGNS: /60   Pulse 90   Temp 36.7 °C (98 °F) (Oral)   Resp 18   Ht 1.575 m (5' 2\")   Wt 81.6 kg (180 lb)   SpO2 98%   BMI 32.92 kg/m²  @[id number]::@     Pulse ox interpretation: 98% I interpret this pulse ox as normal       Constitutional: Well developed, well nourished, alert in no apparent distress, nontoxic appearance    HENT: No external signs of trauma, normocephalic, mask on due to COVID-19 pandemic  Eyes: PERRL, EOMI, vision and visual fields are gross intact bilaterally, conjunctiva without erythema, no discharge, no icterus    Neck: Soft and supple, trachea midline, no stridor, no tenderness, no LAD, no JVD, good ROM    Cardiovascular: Regular rate and rhythm, no murmurs/rubs/gallops, strong distal pulses and good perfusion    Thorax & Lungs: No respiratory distress, CTAB   Abdomen: Soft, nontender, nondistended, no guarding, no rebound, normal BS    Back: No CVAT    Extremities: No cyanosis, no edema, no gross deformity, good ROM, no tenderness, intact distal pulses with brisk cap refill    Skin: Warm, dry, no pallor/cyanosis, no rash noted    Lymphatic: No lymphadenopathy noted    Neuro: Alert and oriented to person, place, and time.  GCS 15.  CN II-XII grossly intact.  Normal speech.  Equal strength bilateral UE/LE.  Sensation intact to touch.  No cerebellar signs.  Normal gait.    Psychiatric: Cooperative, mildly intoxicated, " denies suicidal homicidal ideations, no signs of active hallucinations or delusions      DIAGNOSTIC STUDIES / PROCEDURES        LABS  All labs reviewed by me.     Results for orders placed or performed during the hospital encounter of 12/24/20   POC BREATHALIZER   Result Value Ref Range    POC Breathalizer 0.106 (A) 0.00 - 0.01 Percent        RADIOLOGY  The radiologist's interpretation of all radiological studies have been reviewed by me.     No orders to display          COURSE & MEDICAL DECISION MAKING  Nursing notes, VS, PMSFHx reviewed in chart.     Review of past medical records shows the patient was last seen this ED July 23, 2020 for palpitations.      Differential diagnoses considered include but are not limited to: Anxiety, depression, personality disorder, intoxication/overdose, electrolyte abnormality, thyroid disorder      History and physical exam as above.  On my exam, patient reports that there is nothing wrong with her and that she just wants to go home.  She is mildly intoxicated but well-appearing in no acute distress and nontoxic in appearance with a benign exam.  She denies suicidal or homicidal ideations and is not exhibiting any hallucinations or delusions.  She is able to ambulate without difficulty in the ED.  No apparent focal neurological findings to suggest serious acute pathology or need for emergent imaging.  No apparent acute medical issues except for perhaps mild alcohol intoxication.  Patient was advised to limit her alcohol use and to get rest tonight.  She was encouraged to return to the ED if she has any concerns or new/worsening symptoms.  She was advised on outpatient follow-up.  Patient verbalized understanding and agreed with plan of care with no further questions or concerns.      The patient is referred to a primary physician for blood pressure management, diabetic screening, and for all other preventative health concerns.       FINAL IMPRESSION  1. Alcoholic intoxication  without complication (HCC) Acute          DISPOSITION  Patient will be discharged home in stable condition.       FOLLOW UP  Naresh Jara M.D.  [address] [id number] [phone]    Schedule an appointment as soon as possible for a visit       [facility], Emergency Dept  [address] [facility] [id number] [phone]    If symptoms worsen         OUTPATIENT MEDICATIONS  Discharge Medication List as of 12/24/2020  1:16 AM             Electronically signed by: Frankie Leon D.O., 12/24/2020 1:01 AM      Portions of this record were made with voice recognition software.  Despite my review, spelling/grammar/context errors may still remain.  Interpretation of this chart should be taken in this context.

## 2020-12-24 NOTE — ED TRIAGE NOTES
Echo Brittni Garrison  40 y.o. female  Chief Complaint   Patient presents with   • ALOC     Pt was drinking tonight and now stating she can't get up and she has hip pain.

## 2020-12-24 NOTE — ED NOTES
Pt aox4, skin pink warm and dry, airway patent, rr even and unlabored, nad noted. Pt ambulates upon discharge without new incident or distress. Pt leaves with friend.

## 2020-12-24 NOTE — ED NOTES
Pt ambulating down hallway. Pt refusing to wait for paperwork and second set of vitals. Pt leaves with friend.

## 2021-02-18 ENCOUNTER — TELEMEDICINE (OUTPATIENT)
Dept: MEDICAL GROUP | Facility: LAB | Age: 41
End: 2021-02-18
Payer: COMMERCIAL

## 2021-02-18 VITALS
HEIGHT: 63 IN | WEIGHT: 170 LBS | RESPIRATION RATE: 13 BRPM | BODY MASS INDEX: 30.12 KG/M2 | HEART RATE: 108 BPM | OXYGEN SATURATION: 96 %

## 2021-02-18 DIAGNOSIS — J18.9 PNEUMONIA DUE TO INFECTIOUS ORGANISM, UNSPECIFIED LATERALITY, UNSPECIFIED PART OF LUNG: ICD-10-CM

## 2021-02-18 PROCEDURE — 99214 OFFICE O/P EST MOD 30 MIN: CPT | Mod: 95,CR | Performed by: FAMILY MEDICINE

## 2021-02-18 RX ORDER — COVID-19 MOLECULAR TEST ASSAY
KIT MISCELLANEOUS
COMMUNITY
Start: 2021-02-16

## 2021-02-18 RX ORDER — LEVOFLOXACIN 750 MG/1
750 TABLET, FILM COATED ORAL DAILY
Qty: 7 TABLET | Refills: 0 | Status: SHIPPED | OUTPATIENT
Start: 2021-02-18 | End: 2021-02-25

## 2021-02-18 ASSESSMENT — FIBROSIS 4 INDEX: FIB4 SCORE: 0.52

## 2021-02-18 NOTE — PROGRESS NOTES
"Virtual Visit: Established Patient   This visit was conducted via Zoom using secure and encrypted videoconferencing technology. The patient was in a private location in the state of [place].    The patient's identity was confirmed and verbal consent was obtained for this virtual visit.    Subjective:   CC:   Chief Complaint   Patient presents with   • URI     X few day ago, bought a rapid covid test became negative exposed 3-4 weeks ago to covid,        Stefania Garrison is a 40 y.o. female presenting for evaluation and management of:    #Pneumonia   -10 days of     ROS ***  Denies any recent fevers or chills. No nausea or vomiting. No chest pains or shortness of breath.     Allergies   Allergen Reactions   • Pcn [Penicillins] Anaphylaxis   • Chantix [Varenicline] Hives, Rash and Itching     Reaction from the patches, itching, hives, rash. Reaction from gum, diarrhea.        Current medicines (including changes today)  Current Outpatient Medications   Medication Sig Dispense Refill   • levoFLOXacin (LEVAQUIN) 750 MG tablet Take 1 tablet by mouth every day for 7 days. 7 tablet 0   • ID NOW COVID-19 Kit AS DIRECTED     • LORazepam (ATIVAN PO) Take  by mouth.     • Atorvastatin Calcium (LIPITOR PO) Take  by mouth.       No current facility-administered medications for this visit.       Patient Active Problem List    Diagnosis Date Noted   • Anxiety 11/13/2020   • Tobacco abuse 06/08/2020   • TIA (transient ischemic attack) 08/28/2019   • Aphasia 08/28/2019       History reviewed. No pertinent family history.    She  has a past medical history of Tachy-alcides syndrome (HCC).  She  has a past surgical history that includes cholecystectomy (2018).       Objective:   Pulse (!) 108   Resp 13   Ht 1.6 m (5' 2.99\")   Wt 77.1 kg (170 lb)   SpO2 96%   BMI 30.12 kg/m²     Physical Exam:***  Constitutional: Alert, no distress, well-groomed.  Skin: No rashes in visible areas.  Eye: Round. Conjunctiva clear, lids normal. No " icterus.   ENMT: Lips pink without lesions, good dentition, moist mucous membranes. Phonation normal.  Neck: No masses, no thyromegaly. Moves freely without pain.  Respiratory: Unlabored respiratory effort, no cough or audible wheeze  Psych: Alert and oriented x3, normal affect and mood.       Assessment and Plan:   The following treatment plan was discussed:     1. Pneumonia due to infectious organism, unspecified laterality, unspecified part of lung  - levoFLOXacin (LEVAQUIN) 750 MG tablet; Take 1 tablet by mouth every day for 7 days.  Dispense: 7 tablet; Refill: 0    Other orders  - ID NOW COVID-19 Kit; AS DIRECTED        Follow-up: No follow-ups on file.

## 2021-02-18 NOTE — PROGRESS NOTES
Virtual Visit: Established Patient   This visit was conducted via Zoom using secure and encrypted videoconferencing technology. The patient was in a private location in the state of [place].    The patient's identity was confirmed and verbal consent was obtained for this virtual visit.    Subjective:   CC:   Chief Complaint   Patient presents with   • URI     X few day ago, bought a rapid covid test became negative exposed 3-4 weeks ago to covid,        Stefania Garrison is a 40 y.o. female presenting for evaluation and management of:    #URI symptoms:  -Patient states for the last 10 days she has had symptoms including congestion, fevers, chest pain, fatigue with exertion, increased malaise.  Recently has developed a productive cough and increasing shortness of breath.  She has been checking her oxygen saturations which have been above 90 but states has become harder and harder to carry on a conversation.  She has recently taken COVID-19 test which was negative.  She denies any sick contacts, any recent traveling.    ROS   Denies any recent fevers or chills. No nausea or vomiting. No chest pains or shortness of breath.     Allergies   Allergen Reactions   • Pcn [Penicillins] Anaphylaxis   • Chantix [Varenicline] Hives, Rash and Itching     Reaction from the patches, itching, hives, rash. Reaction from gum, diarrhea.        Current medicines (including changes today)  Current Outpatient Medications   Medication Sig Dispense Refill   • levoFLOXacin (LEVAQUIN) 750 MG tablet Take 1 tablet by mouth every day for 7 days. 7 tablet 0   • ID NOW COVID-19 Kit AS DIRECTED     • LORazepam (ATIVAN PO) Take  by mouth.     • Atorvastatin Calcium (LIPITOR PO) Take  by mouth.       No current facility-administered medications for this visit.       Patient Active Problem List    Diagnosis Date Noted   • Anxiety 11/13/2020   • Tobacco abuse 06/08/2020   • TIA (transient ischemic attack) 08/28/2019   • Aphasia 08/28/2019       History  "reviewed. No pertinent family history.    She  has a past medical history of Tachy-alcides syndrome (HCC).  She  has a past surgical history that includes cholecystectomy (2018).       Objective:   Pulse (!) 108   Resp 13   Ht 1.6 m (5' 2.99\")   Wt 77.1 kg (170 lb)   SpO2 96%   BMI 30.12 kg/m²     Physical Exam:  Constitutional: Alert, no distress, well-groomed.  Skin: No rashes in visible areas.  Eye: Round. Conjunctiva clear, lids normal. No icterus.   ENMT: Lips pink without lesions, good dentition, moist mucous membranes. Phonation normal.  Neck: No masses, no thyromegaly. Moves freely without pain.  Respiratory: Unlabored respiratory effort, no cough or audible wheeze  Psych: Alert and oriented x3, normal affect and mood.       Assessment and Plan:   The following treatment plan was discussed:     1. Pneumonia due to infectious organism, unspecified laterality, unspecified part of lung  -Signs and symptoms at this time are concerning for possible pneumonia.  We will treat with Levaquin given patient's severe allergy to penicillins.  -Patient does have DuoNeb solution at home which she has been using.  Encouraged to continue using it is helping.  -Strict return precautions were given.  If oxygen saturations dipped below 90% she is to follow-up in urgent care or emergency department.  Patient understands and agrees with current treatment plan.  - levoFLOXacin (LEVAQUIN) 750 MG tablet; Take 1 tablet by mouth every day for 7 days.  Dispense: 7 tablet; Refill: 0      Follow-up: Return if symptoms worsen or fail to improve.         "

## 2021-07-08 ENCOUNTER — HOSPITAL ENCOUNTER (EMERGENCY)
Facility: MEDICAL CENTER | Age: 41
End: 2021-07-09
Attending: EMERGENCY MEDICINE
Payer: COMMERCIAL

## 2021-07-08 VITALS
DIASTOLIC BLOOD PRESSURE: 53 MMHG | HEIGHT: 62 IN | SYSTOLIC BLOOD PRESSURE: 102 MMHG | HEART RATE: 97 BPM | TEMPERATURE: 98.1 F | RESPIRATION RATE: 19 BRPM | OXYGEN SATURATION: 93 % | BODY MASS INDEX: 30.36 KG/M2 | WEIGHT: 165 LBS

## 2021-07-08 DIAGNOSIS — F10.920 ALCOHOLIC INTOXICATION WITHOUT COMPLICATION (HCC): ICD-10-CM

## 2021-07-08 DIAGNOSIS — F10.20 ALCOHOLISM (HCC): ICD-10-CM

## 2021-07-08 LAB
ALBUMIN SERPL BCP-MCNC: 4.4 G/DL (ref 3.2–4.9)
ALBUMIN/GLOB SERPL: 1.3 G/DL
ALP SERPL-CCNC: 73 U/L (ref 30–99)
ALT SERPL-CCNC: 24 U/L (ref 2–50)
ANION GAP SERPL CALC-SCNC: 14 MMOL/L (ref 7–16)
AST SERPL-CCNC: 26 U/L (ref 12–45)
BASOPHILS # BLD AUTO: 1.1 % (ref 0–1.8)
BASOPHILS # BLD: 0.13 K/UL (ref 0–0.12)
BILIRUB SERPL-MCNC: 0.2 MG/DL (ref 0.1–1.5)
BUN SERPL-MCNC: 7 MG/DL (ref 8–22)
CALCIUM SERPL-MCNC: 8.3 MG/DL (ref 8.4–10.2)
CHLORIDE SERPL-SCNC: 104 MMOL/L (ref 96–112)
CO2 SERPL-SCNC: 18 MMOL/L (ref 20–33)
CREAT SERPL-MCNC: 0.65 MG/DL (ref 0.5–1.4)
EOSINOPHIL # BLD AUTO: 0.79 K/UL (ref 0–0.51)
EOSINOPHIL NFR BLD: 6.6 % (ref 0–6.9)
ERYTHROCYTE [DISTWIDTH] IN BLOOD BY AUTOMATED COUNT: 43.5 FL (ref 35.9–50)
ETHANOL BLD-MCNC: 203.2 MG/DL (ref 0–10)
GLOBULIN SER CALC-MCNC: 3.3 G/DL (ref 1.9–3.5)
GLUCOSE SERPL-MCNC: 98 MG/DL (ref 65–99)
HCG SERPL QL: NEGATIVE
HCT VFR BLD AUTO: 42.7 % (ref 37–47)
HGB BLD-MCNC: 14.8 G/DL (ref 12–16)
IMM GRANULOCYTES # BLD AUTO: 0.06 K/UL (ref 0–0.11)
IMM GRANULOCYTES NFR BLD AUTO: 0.5 % (ref 0–0.9)
LIPASE SERPL-CCNC: 41 U/L (ref 7–58)
LYMPHOCYTES # BLD AUTO: 3.18 K/UL (ref 1–4.8)
LYMPHOCYTES NFR BLD: 26.4 % (ref 22–41)
MCH RBC QN AUTO: 32.7 PG (ref 27–33)
MCHC RBC AUTO-ENTMCNC: 34.7 G/DL (ref 33.6–35)
MCV RBC AUTO: 94.3 FL (ref 81.4–97.8)
MONOCYTES # BLD AUTO: 0.67 K/UL (ref 0–0.85)
MONOCYTES NFR BLD AUTO: 5.6 % (ref 0–13.4)
NEUTROPHILS # BLD AUTO: 7.21 K/UL (ref 2–7.15)
NEUTROPHILS NFR BLD: 59.8 % (ref 44–72)
NRBC # BLD AUTO: 0 K/UL
NRBC BLD-RTO: 0 /100 WBC
PLATELET # BLD AUTO: 366 K/UL (ref 164–446)
PMV BLD AUTO: 9.9 FL (ref 9–12.9)
POTASSIUM SERPL-SCNC: 3.9 MMOL/L (ref 3.6–5.5)
PROT SERPL-MCNC: 7.7 G/DL (ref 6–8.2)
RBC # BLD AUTO: 4.53 M/UL (ref 4.2–5.4)
SODIUM SERPL-SCNC: 136 MMOL/L (ref 135–145)
TROPONIN T SERPL-MCNC: <6 NG/L (ref 6–19)
WBC # BLD AUTO: 12 K/UL (ref 4.8–10.8)

## 2021-07-08 PROCEDURE — 94760 N-INVAS EAR/PLS OXIMETRY 1: CPT

## 2021-07-08 PROCEDURE — 82077 ASSAY SPEC XCP UR&BREATH IA: CPT

## 2021-07-08 PROCEDURE — 93005 ELECTROCARDIOGRAM TRACING: CPT | Performed by: EMERGENCY MEDICINE

## 2021-07-08 PROCEDURE — 99285 EMERGENCY DEPT VISIT HI MDM: CPT

## 2021-07-08 PROCEDURE — 80053 COMPREHEN METABOLIC PANEL: CPT

## 2021-07-08 PROCEDURE — 85025 COMPLETE CBC W/AUTO DIFF WBC: CPT

## 2021-07-08 PROCEDURE — 96374 THER/PROPH/DIAG INJ IV PUSH: CPT

## 2021-07-08 PROCEDURE — 700101 HCHG RX REV CODE 250: Performed by: EMERGENCY MEDICINE

## 2021-07-08 PROCEDURE — 84484 ASSAY OF TROPONIN QUANT: CPT

## 2021-07-08 PROCEDURE — 84443 ASSAY THYROID STIM HORMONE: CPT

## 2021-07-08 PROCEDURE — 84703 CHORIONIC GONADOTROPIN ASSAY: CPT

## 2021-07-08 PROCEDURE — 83690 ASSAY OF LIPASE: CPT

## 2021-07-08 RX ADMIN — THIAMINE HYDROCHLORIDE: 100 INJECTION, SOLUTION INTRAMUSCULAR; INTRAVENOUS at 23:25

## 2021-07-08 ASSESSMENT — FIBROSIS 4 INDEX: FIB4 SCORE: 0.52

## 2021-07-09 LAB
EKG IMPRESSION: NORMAL
TSH SERPL DL<=0.005 MIU/L-ACNC: 1.59 UIU/ML (ref 0.38–5.33)

## 2021-07-09 NOTE — ED NOTES
Patient wants to leave against medical advice. Patient demanding to have her IV out and informed patient Dr Leon needs to reevaluate her.

## 2021-07-09 NOTE — ED PROVIDER NOTES
"ED Provider Note    CHIEF COMPLAINT  Chief Complaint   Patient presents with   • Alcohol Intoxication   • Anxiety        [facility]    Primary care provider: Naresh Jara M.D.   History obtained from: EMS  History limited by: Patient arrived sedated and intoxicated    Stefania Garrison is a 40 y.o. female who presents to the ED via EMS for reportedly alcohol intoxication and anxiety.  Patient was given 4 mg of Zofran and 5 mg of Versed by EMS prior to arrival and arrives sedated and obviously intoxicated.  No further report was given.  No reported injury or trauma.    REVIEW OF SYSTEMS  Please see HPI for pertinent positives/negatives.  Limited by patient's intoxicated condition.    PAST MEDICAL HISTORY  Past Medical History:   Diagnosis Date   • Tachy-alcides syndrome (HCC)         SURGICAL HISTORY  Past Surgical History:   Procedure Laterality Date   • CHOLECYSTECTOMY  2018        SOCIAL HISTORY  Social History     Tobacco Use   • Smoking status: Current Every Day Smoker     Packs/day: 1.50     Years: 20.00     Pack years: 30.00     Types: Cigarettes   • Smokeless tobacco: Never Used   • Tobacco comment: 1-2 ppd   Vaping Use   • Vaping Use: Never used   Substance and Sexual Activity   • Alcohol use: Yes   • Drug use: Not Currently     Types: Marijuana, Inhaled   • Sexual activity: Yes     Birth control/protection: Surgical        FAMILY HISTORY  No family history on file.     CURRENT MEDICATIONS  Home Medications    **Home medications have not yet been reviewed for this encounter**          ALLERGIES  Allergies   Allergen Reactions   • Pcn [Penicillins] Anaphylaxis   • Chantix [Varenicline] Hives, Rash and Itching     Reaction from the patches, itching, hives, rash. Reaction from gum, diarrhea.         PHYSICAL EXAM  VITAL SIGNS: /53   Pulse 97   Temp 36.7 °C (98.1 °F) (Temporal)   Resp 19   Ht 1.575 m (5' 2\")   Wt 74.8 kg (165 lb)   SpO2 93%   BMI 30.18 kg/m²  @NANI[[id number]::@     Pulse ox " interpretation: 93% I interpret this pulse ox as normal     Cardiac monitor interpretation: Sinus rhythm with heart rate in the 90s as interpreted by me.  The patient presented with altered mental status and cardiac monitor was ordered to monitor for dysrhythmia.    Constitutional: Well developed, well nourished, somnolent in no apparent distress, nontoxic appearance    HENT: No external signs of trauma, normocephalic, oropharynx moist and clear, patient able to maintain her airway with positive gag reflex, nose normal    Eyes: PERRL, conjunctiva without erythema, no discharge, no icterus    Neck: Soft and supple, trachea midline, no stridor, no tenderness, no LAD, no JVD, good ROM    Cardiovascular: Regular rate and rhythm, no murmurs/rubs/gallops, strong distal pulses and good perfusion    Thorax & Lungs: No respiratory distress, CTAB   Abdomen: Soft, nontender, nondistended, no guarding, no rebound, normal BS    Back: No CVAT    Extremities: No cyanosis, no edema, no gross deformity, good ROM, no tenderness, intact distal pulses with brisk cap refill    Skin: Warm, dry, no pallor/cyanosis, no rash noted    Lymphatic: No lymphadenopathy noted    Neuro: Somnolent, GCS E1V2M4, noted to move all four extremities spontaneously, no apparent tremors or shakes  Psychiatric: Unable to assess      DIAGNOSTIC STUDIES / PROCEDURES    EKG  12 Lead EKG obtained at 2317 and interpreted by me:   Rate: 81   Rhythm: Sinus rhythm   Ectopy: None  Intervals: Normal   Axis: Normal   QRS: Normal   ST segments: Normal  T Waves: Normal    Clinical Impression: Sinus rhythm without acute ischemic changes or dysrhythmia  Compared to July 23, 2020 without significant change      LABS  All labs reviewed by me.     Results for orders placed or performed during the hospital encounter of 07/08/21   CBC WITH DIFFERENTIAL   Result Value Ref Range    WBC 12.0 (H) 4.8 - 10.8 K/uL    RBC 4.53 4.20 - 5.40 M/uL    Hemoglobin 14.8 12.0 - 16.0 g/dL     Hematocrit 42.7 37.0 - 47.0 %    MCV 94.3 81.4 - 97.8 fL    MCH 32.7 27.0 - 33.0 pg    MCHC 34.7 33.6 - 35.0 g/dL    RDW 43.5 35.9 - 50.0 fL    Platelet Count 366 164 - 446 K/uL    MPV 9.9 9.0 - 12.9 fL    Neutrophils-Polys 59.80 44.00 - 72.00 %    Lymphocytes 26.40 22.00 - 41.00 %    Monocytes 5.60 0.00 - 13.40 %    Eosinophils 6.60 0.00 - 6.90 %    Basophils 1.10 0.00 - 1.80 %    Immature Granulocytes 0.50 0.00 - 0.90 %    Nucleated RBC 0.00 /100 WBC    Neutrophils (Absolute) 7.21 (H) 2.00 - 7.15 K/uL    Lymphs (Absolute) 3.18 1.00 - 4.80 K/uL    Monos (Absolute) 0.67 0.00 - 0.85 K/uL    Eos (Absolute) 0.79 (H) 0.00 - 0.51 K/uL    Baso (Absolute) 0.13 (H) 0.00 - 0.12 K/uL    Immature Granulocytes (abs) 0.06 0.00 - 0.11 K/uL    NRBC (Absolute) 0.00 K/uL   COMP METABOLIC PANEL   Result Value Ref Range    Sodium 136 135 - 145 mmol/L    Potassium 3.9 3.6 - 5.5 mmol/L    Chloride 104 96 - 112 mmol/L    Co2 18 (L) 20 - 33 mmol/L    Anion Gap 14.0 7.0 - 16.0    Glucose 98 65 - 99 mg/dL    Bun 7 (L) 8 - 22 mg/dL    Creatinine 0.65 0.50 - 1.40 mg/dL    Calcium 8.3 (L) 8.4 - 10.2 mg/dL    AST(SGOT) 26 12 - 45 U/L    ALT(SGPT) 24 2 - 50 U/L    Alkaline Phosphatase 73 30 - 99 U/L    Total Bilirubin 0.2 0.1 - 1.5 mg/dL    Albumin 4.4 3.2 - 4.9 g/dL    Total Protein 7.7 6.0 - 8.2 g/dL    Globulin 3.3 1.9 - 3.5 g/dL    A-G Ratio 1.3 g/dL   LIPASE   Result Value Ref Range    Lipase 41 7 - 58 U/L   TROPONIN   Result Value Ref Range    Troponin T <6 6 - 19 ng/L   BETA-HCG QUALITATIVE SERUM   Result Value Ref Range    Beta-Hcg Qualitative Serum Negative Negative   TSH WITH REFLEX TO FT4   Result Value Ref Range    TSH 1.590 0.380 - 5.330 uIU/mL   ETHYL ALCOHOL (BLOOD)   Result Value Ref Range    Diagnostic Alcohol 203.2 (H) 0.0 - 10.0 mg/dL   ESTIMATED GFR   Result Value Ref Range    GFR If African American >60 >60 mL/min/1.73 m 2    GFR If Non African American >60 >60 mL/min/1.73 m 2   EKG (NOW)   Result Value Ref Range    Report        [facility]    Test Date:  2021  Pt Name:    JOCELYN WILDE               Department: [facility]  MRN:        [id number]                      Room:       -ROOM 7  Gender:     Female                       Technician: GEETA  :        1980                   Requested By:KIERA RÍOS  Order #:    [id number]                    Reading MD:    Measurements  Intervals                                Axis  Rate:       81                           P:          14  GA:         184                          QRS:        24  QRSD:       78                           T:          51  QT:         384  QTc:        446    Interpretive Statements  SINUS RHYTHM  RSR' IN V1 OR V2, PROBABLY NORMAL VARIANT  Compared to ECG 2020 18:58:51  RSR' in V1 or V2 now present          RADIOLOGY  The radiologist's interpretation of all radiological studies have been reviewed by me.     No orders to display          COURSE & MEDICAL DECISION MAKING  Nursing notes, VS, PMSFHx reviewed in chart.     Review of past medical records shows the patient was last seen at [facility] ED by myself on 2020 for altered level of consciousness due to alcohol intoxication.      Differential diagnoses considered include but are not limited to: CVA/TIA/intracranial hemorrhage, syncope, meningitis/encephalitis, sepsis, hepatic encephalopathy, Sz, UTI, OD/intoxication, hypoglycemia, electrolyte abnormality, endocrine dysfunction, thyroid disorder       History and physical exam as above.  Patient arrived with reported alcohol intoxication and also was sedated after being given Versed by EMS.  Limited initial history/review of systems/physical exam due to her clinical condition.  EKG without acute findings or significant change compared to prior.  Patient's alcohol level is 203.  Labs were otherwise fairly unremarkable.  Given her history of alcohol abuse, she was given a detox bag and  monitored in the ED.  I was later informed by ED nurse that patient was alert and demanded to be discharged.  I was in the process of reevaluating the patient when she was noted to be walking very fast out of the ED hallway in no acute distress and nontoxic in appearance with her son chasing after her.  She was later found by the son in the car and he will take her home.  Patient with no further complaints and refused discharge instructions      HYDRATION: Based on the patient's presentation of Other Alcohol intoxication the patient was given IV fluids. IV Hydration was used because oral hydration was not as rapid as required. Upon recheck following hydration, the patient was improved.      The patient is referred to a primary physician for blood pressure management, diabetic screening, and for all other preventative health concerns.       FINAL IMPRESSION  1. Alcoholic intoxication without complication (HCC) Acute   2. Alcoholism (HCC) Active          DISPOSITION  Patient will be discharged home in stable condition.       FOLLOW UP  Naresh Jara M.D.  [address] [facility] [address]  [place] [address]  [phone]    Call today      [facility], Emergency Dept  [address]  [facility] [address]  [phone]    If symptoms worsen         OUTPATIENT MEDICATIONS  Discharge Medication List as of 7/9/2021 12:52 AM             Electronically signed by: Frankie Leon D.O., 7/8/2021 10:58 PM      Portions of this record were made with voice recognition software.  Despite my review, spelling/grammar/context errors may still remain.  Interpretation of this chart should be taken in this context.

## 2021-07-09 NOTE — ED TRIAGE NOTES
Pt bib [facility] coming from home for ETOH and anxiety. Pt was given 4 mg zofran, and 5 mg versed by EMS enroute.

## 2021-07-09 NOTE — ED NOTES
Son at bedside and briefly updated him with her plan of care.  Patient wakes up to her name and placed in a high singh.

## 2021-07-09 NOTE — ED NOTES
"Patient was not in the room and son looking for his mom outside in the parking lot.  The Charge RN, MOLLY tech and security looking for patient in the back entrance of the ambulance bay area    This primary RN walked outside of the front parking lot unable to locate patient    A few minutes later the patient's son came back and told us he found her and she is in the car,  IV saline lock removed by the patient  with cathlon intact handed to me by the patient's son.   Patient declined to sign the discharge instructions form.  Informed patient's son if she changes her mind to be evaluated for her \"possible seizure\" to return to the ED.  Son verbalizes understanding.   "